# Patient Record
Sex: FEMALE | Race: BLACK OR AFRICAN AMERICAN
[De-identification: names, ages, dates, MRNs, and addresses within clinical notes are randomized per-mention and may not be internally consistent; named-entity substitution may affect disease eponyms.]

---

## 2017-08-24 ENCOUNTER — HOSPITAL ENCOUNTER (EMERGENCY)
Dept: HOSPITAL 23 - CED | Age: 27
Discharge: HOME | End: 2017-08-24
Payer: COMMERCIAL

## 2017-08-24 VITALS — WEIGHT: 124.98 LBS | HEIGHT: 66 IN | BODY MASS INDEX: 20.09 KG/M2

## 2017-08-24 DIAGNOSIS — N93.8: Primary | ICD-10-CM

## 2017-08-24 LAB
BARBITURATES UR QL SCN: 0.6 MG/DL (ref 0.2–2)
BARBITURATES UR QL SCN: 4 G/DL (ref 3.5–5)
BASOPHIL#: 0 X10E3 (ref 0–0.3)
BASOPHIL%: 0.5 % (ref 0–2.5)
BENZODIAZ UR QL SCN: 16 U/L (ref 10–40)
BENZODIAZ UR QL SCN: 20 U/L (ref 10–42)
BLOOD UREA NITROGEN: 11 MG/DL (ref 9–23)
BUN/CREATININE RATIO: 13.75
BZE UR QL SCN: 70 U/L (ref 32–92)
CALCIUM SERUM: 9 MG/DL (ref 8.4–10.2)
CK MB SERPL-RTO: 12.9 % (ref 11–15.5)
CK MB SERPL-RTO: 34.2 G/DL (ref 30–36)
CREATININE SERUM: 0.8 MG/DL (ref 0.6–1.4)
DIFF IND: NO
EOSINOPHIL#: 0.5 X10E3 (ref 0–0.7)
EOSINOPHIL%: 7.9 % (ref 0–7)
GENTAMICIN PEAK SERPL-MCNC: NO MG/L
GLOM FILT RATE ESTIMATED: 102.6 ML/MIN (ref 60–?)
GLUCOSE FASTING: 89 MG/DL (ref 70–110)
HEMATOCRIT: 37.5 % (ref 35–45)
HEMOGLOBIN: 12.8 GM/DL (ref 12–16)
KETONES UR QL: 104 MMOL/L (ref 100–111)
KETONES UR QL: 27 MMOL/L (ref 22–31)
LIPASE: 24 U/L (ref 22–51)
LYMPHOCYTE#: 3 X10E3 (ref 1–3.5)
LYMPHOCYTE%: 43.6 % (ref 17–45)
MEAN CELL VOLUME: 89.9 FL (ref 83–96)
MEAN CORPUSCULAR HEMOGLOBIN: 30.7 PG (ref 28–34)
MEAN PLATELET VOLUME: 7.9 FL (ref 6.5–11.5)
MONOCYTE#: 0.6 X10E3 (ref 0–1)
MONOCYTE%: 8.1 % (ref 3–12)
NEUTROPHIL#: 2.8 X10E3 (ref 1.5–7.1)
NEUTROPHIL%: 39.9 % (ref 40–75)
PLATELET COUNT: 270 X10E3 (ref 140–420)
POTASSIUM: 4.6 MMOL/L (ref 3.5–5.1)
PROTEIN TOTAL SERUM: 7.4 G/DL (ref 6–8.3)
RED BLOOD COUNT: 4.17 X10E (ref 3.9–5.3)
SODIUM: 137 MMOL/L (ref 135–145)
URBCS1 AUWI: (no result) /[HPF] (ref 0–2)
URINE APPEARANCE: CLEAR
URINE BACTERIA AUWI: (no result)
URINE BILIRUBIN: (no result)
URINE BLOOD: (no result)
URINE COLOR: YELLOW
URINE GLUCOSE: (no result) MG/DL
URINE KETONE: (no result)
URINE LEUKOCYTE ESTERASE: (no result)
URINE NITRATE: (no result)
URINE PH: 6.5 (ref 5–8)
URINE PROTEIN: (no result)
URINE SOURCE: (no result)
URINE SPECIFIC GRAVITY: 1.02 (ref 1–1.03)
URINE SQUAMOUS EPITHELIAL CELL: (no result) /[HPF]
URINE UROBILINOGEN: 0.2 MG/DL
UWBCS1 AUWI: (no result) (ref 0–5)
WHITE BLOOD COUNT: 6.9 X10E3 (ref 4–10.5)

## 2018-11-05 ENCOUNTER — HOSPITAL ENCOUNTER (EMERGENCY)
Facility: HOSPITAL | Age: 28
Discharge: HOME | End: 2018-11-05
Attending: EMERGENCY MEDICINE | Admitting: EMERGENCY MEDICINE

## 2018-11-05 ENCOUNTER — APPOINTMENT (OUTPATIENT)
Dept: CT IMAGING | Facility: HOSPITAL | Age: 28
End: 2018-11-05

## 2018-11-05 VITALS
RESPIRATION RATE: 18 BRPM | TEMPERATURE: 97.9 F | HEART RATE: 64 BPM | SYSTOLIC BLOOD PRESSURE: 133 MMHG | OXYGEN SATURATION: 98 % | WEIGHT: 149.9 LBS | DIASTOLIC BLOOD PRESSURE: 91 MMHG | HEIGHT: 69 IN | BODY MASS INDEX: 22.2 KG/M2

## 2018-11-05 DIAGNOSIS — R10.30 LOWER ABDOMINAL PAIN: Primary | ICD-10-CM

## 2018-11-05 DIAGNOSIS — N39.0 ACUTE UTI: ICD-10-CM

## 2018-11-05 LAB
ALBUMIN SERPL-MCNC: 3.8 G/DL (ref 3.5–5.2)
ALBUMIN/GLOB SERPL: 1.1 G/DL
ALP SERPL-CCNC: 74 U/L (ref 39–117)
ALT SERPL W P-5'-P-CCNC: 13 U/L (ref 1–33)
ANION GAP SERPL CALCULATED.3IONS-SCNC: 10.8 MMOL/L
AST SERPL-CCNC: 17 U/L (ref 1–32)
BACTERIA UR QL AUTO: ABNORMAL /HPF
BASOPHILS # BLD AUTO: 0.04 10*3/MM3 (ref 0–0.2)
BASOPHILS NFR BLD AUTO: 0.5 % (ref 0–1.5)
BILIRUB SERPL-MCNC: 0.3 MG/DL (ref 0.1–1.2)
BILIRUB UR QL STRIP: NEGATIVE
BUN BLD-MCNC: 12 MG/DL (ref 6–20)
BUN/CREAT SERPL: 16 (ref 7–25)
CALCIUM SPEC-SCNC: 8.8 MG/DL (ref 8.6–10.5)
CHLORIDE SERPL-SCNC: 105 MMOL/L (ref 98–107)
CLARITY UR: ABNORMAL
CO2 SERPL-SCNC: 25.2 MMOL/L (ref 22–29)
COLOR UR: YELLOW
CREAT BLD-MCNC: 0.75 MG/DL (ref 0.57–1)
DEPRECATED RDW RBC AUTO: 43.4 FL (ref 37–54)
EOSINOPHIL # BLD AUTO: 0.7 10*3/MM3 (ref 0–0.7)
EOSINOPHIL NFR BLD AUTO: 8.2 % (ref 0.3–6.2)
ERYTHROCYTE [DISTWIDTH] IN BLOOD BY AUTOMATED COUNT: 13.7 % (ref 11.7–13)
GFR SERPL CREATININE-BSD FRML MDRD: 113 ML/MIN/1.73
GLOBULIN UR ELPH-MCNC: 3.5 GM/DL
GLUCOSE BLD-MCNC: 120 MG/DL (ref 65–99)
GLUCOSE UR STRIP-MCNC: NEGATIVE MG/DL
HCG SERPL QL: NEGATIVE
HCT VFR BLD AUTO: 34.2 % (ref 35.6–45.5)
HGB BLD-MCNC: 11.4 G/DL (ref 11.9–15.5)
HGB UR QL STRIP.AUTO: ABNORMAL
HYALINE CASTS UR QL AUTO: ABNORMAL /LPF
IMM GRANULOCYTES # BLD: 0.01 10*3/MM3 (ref 0–0.03)
IMM GRANULOCYTES NFR BLD: 0.1 % (ref 0–0.5)
KETONES UR QL STRIP: NEGATIVE
LEUKOCYTE ESTERASE UR QL STRIP.AUTO: ABNORMAL
LYMPHOCYTES # BLD AUTO: 3.27 10*3/MM3 (ref 0.9–4.8)
LYMPHOCYTES NFR BLD AUTO: 38.4 % (ref 19.6–45.3)
MCH RBC QN AUTO: 29.1 PG (ref 26.9–32)
MCHC RBC AUTO-ENTMCNC: 33.3 G/DL (ref 32.4–36.3)
MCV RBC AUTO: 87.2 FL (ref 80.5–98.2)
MONOCYTES # BLD AUTO: 0.74 10*3/MM3 (ref 0.2–1.2)
MONOCYTES NFR BLD AUTO: 8.7 % (ref 5–12)
NEUTROPHILS # BLD AUTO: 3.77 10*3/MM3 (ref 1.9–8.1)
NEUTROPHILS NFR BLD AUTO: 44.2 % (ref 42.7–76)
NITRITE UR QL STRIP: NEGATIVE
PH UR STRIP.AUTO: 6 [PH] (ref 5–8)
PLATELET # BLD AUTO: 262 10*3/MM3 (ref 140–500)
PMV BLD AUTO: 9.9 FL (ref 6–12)
POTASSIUM BLD-SCNC: 3.5 MMOL/L (ref 3.5–5.2)
PROT SERPL-MCNC: 7.3 G/DL (ref 6–8.5)
PROT UR QL STRIP: NEGATIVE
RBC # BLD AUTO: 3.92 10*6/MM3 (ref 3.9–5.2)
RBC # UR: ABNORMAL /HPF
REF LAB TEST METHOD: ABNORMAL
SODIUM BLD-SCNC: 141 MMOL/L (ref 136–145)
SP GR UR STRIP: >=1.03 (ref 1–1.03)
SQUAMOUS #/AREA URNS HPF: ABNORMAL /HPF
UROBILINOGEN UR QL STRIP: ABNORMAL
WBC NRBC COR # BLD: 8.52 10*3/MM3 (ref 4.5–10.7)
WBC UR QL AUTO: ABNORMAL /HPF

## 2018-11-05 PROCEDURE — 81001 URINALYSIS AUTO W/SCOPE: CPT | Performed by: NURSE PRACTITIONER

## 2018-11-05 PROCEDURE — 80053 COMPREHEN METABOLIC PANEL: CPT | Performed by: NURSE PRACTITIONER

## 2018-11-05 PROCEDURE — 99284 EMERGENCY DEPT VISIT MOD MDM: CPT

## 2018-11-05 PROCEDURE — 84703 CHORIONIC GONADOTROPIN ASSAY: CPT | Performed by: NURSE PRACTITIONER

## 2018-11-05 PROCEDURE — 87086 URINE CULTURE/COLONY COUNT: CPT | Performed by: EMERGENCY MEDICINE

## 2018-11-05 PROCEDURE — 25010000002 IOPAMIDOL 61 % SOLUTION: Performed by: EMERGENCY MEDICINE

## 2018-11-05 PROCEDURE — 85025 COMPLETE CBC W/AUTO DIFF WBC: CPT | Performed by: NURSE PRACTITIONER

## 2018-11-05 PROCEDURE — 74177 CT ABD & PELVIS W/CONTRAST: CPT

## 2018-11-05 RX ORDER — SULFAMETHOXAZOLE AND TRIMETHOPRIM 800; 160 MG/1; MG/1
1 TABLET ORAL 2 TIMES DAILY
Qty: 10 TABLET | Refills: 0 | Status: SHIPPED | OUTPATIENT
Start: 2018-11-05 | End: 2022-07-14

## 2018-11-05 RX ADMIN — IOPAMIDOL 85 ML: 612 INJECTION, SOLUTION INTRAVENOUS at 08:24

## 2018-11-05 NOTE — ED NOTES
Patient ambulated to restroom, but was unable to give a urine specimen at this tiime.     Edwina Gonzalez RN  11/05/18 0709

## 2018-11-05 NOTE — ED PROVIDER NOTES
"EMERGENCY DEPARTMENT ENCOUNTER    CHIEF COMPLAINT  Chief Complaint: Lower abd pain  History given by:Pt  History limited by:Nothing  Time Seen: 0642  Room Number: 15/15  PMD: Provider, No Known      HPI:  Pt is a 28 y.o. female who presents with lower abd pain which began an hour ago and woke her from sleep. The pt denies N/V/D, back pain, dysuria, or difficulty urinating. The pt drank milk after the pain started.     Duration: 1 hour  Timing:Constant  Location:Lower abd  Radiation:None  Quality:\"pain\"  Intensity/Severity:Moderate  Progression:No change  Associated Symptoms:None  Aggravating Factors:Unknown  Alleviating Factors:Unknown  Treatment before arrival:None    PAST MEDICAL HISTORY  Active Ambulatory Problems     Diagnosis Date Noted   • No Active Ambulatory Problems     Resolved Ambulatory Problems     Diagnosis Date Noted   • No Resolved Ambulatory Problems     No Additional Past Medical History       PAST SURGICAL HISTORY  History reviewed. No pertinent surgical history.    FAMILY HISTORY  History reviewed. No pertinent family history.    SOCIAL HISTORY  Social History     Social History   • Marital status:      Spouse name: N/A   • Number of children: N/A   • Years of education: N/A     Occupational History   • Not on file.     Social History Main Topics   • Smoking status: Never Smoker   • Smokeless tobacco: Not on file   • Alcohol use No   • Drug use: No   • Sexual activity: Not on file     Other Topics Concern   • Not on file     Social History Narrative   • No narrative on file         ALLERGIES  Patient has no known allergies.    REVIEW OF SYSTEMS  Review of Systems   Constitutional: Negative.  Negative for activity change, appetite change ( decreased), chills, fatigue and fever.   HENT: Negative.  Negative for congestion, ear pain, rhinorrhea and sore throat.    Eyes: Negative.    Respiratory: Negative.  Negative for cough and shortness of breath.    Cardiovascular: Negative.  Negative for " chest pain, palpitations and leg swelling ( pedal).   Gastrointestinal: Positive for abdominal pain. Negative for constipation, diarrhea, nausea and vomiting.   Endocrine: Negative.    Genitourinary: Negative.  Negative for decreased urine volume, difficulty urinating, dysuria, menstrual problem, pelvic pain, urgency and vaginal discharge.   Musculoskeletal: Negative.  Negative for back pain.   Skin: Negative.  Negative for rash.   Allergic/Immunologic: Negative.    Neurological: Negative.  Negative for dizziness, weakness, light-headedness, numbness and headaches.   Hematological: Negative.    Psychiatric/Behavioral: Negative.  The patient is not nervous/anxious.    All other systems reviewed and are negative.      PHYSICAL EXAM  ED Triage Vitals   Temp Heart Rate Resp BP SpO2   11/05/18 0633 11/05/18 0632 11/05/18 0632 11/05/18 0632 11/05/18 0632   98.3 °F (36.8 °C) 64 16 145/98 99 %      Temp src Heart Rate Source Patient Position BP Location FiO2 (%)   11/05/18 0633 -- -- -- --   Tympanic           Physical Exam   Constitutional: She is well-developed, well-nourished, and in no distress. No distress.   HENT:   Head: Normocephalic and atraumatic.   Mouth/Throat: Oropharynx is clear and moist and mucous membranes are normal.   Eyes: Pupils are equal, round, and reactive to light.   Neck: Normal range of motion.   Cardiovascular: Normal rate, regular rhythm and normal heart sounds.    Pulmonary/Chest: Effort normal and breath sounds normal. She has no wheezes.   Abdominal: Soft. Bowel sounds are normal. There is tenderness ( mild ) in the suprapubic area.   Musculoskeletal: Normal range of motion. She exhibits no edema.   Neurological: She is alert.   Skin: Skin is warm and dry. No rash noted.   Psychiatric: Mood, memory, affect and judgment normal.   Nursing note and vitals reviewed.      LAB RESULTS  Recent Results (from the past 24 hour(s))   hCG, Serum, Qualitative    Collection Time: 11/05/18  7:03 AM   Result  Value Ref Range    HCG Qualitative Negative Negative   Comprehensive Metabolic Panel    Collection Time: 11/05/18  7:04 AM   Result Value Ref Range    Glucose 120 (H) 65 - 99 mg/dL    BUN 12 6 - 20 mg/dL    Creatinine 0.75 0.57 - 1.00 mg/dL    Sodium 141 136 - 145 mmol/L    Potassium 3.5 3.5 - 5.2 mmol/L    Chloride 105 98 - 107 mmol/L    CO2 25.2 22.0 - 29.0 mmol/L    Calcium 8.8 8.6 - 10.5 mg/dL    Total Protein 7.3 6.0 - 8.5 g/dL    Albumin 3.80 3.50 - 5.20 g/dL    ALT (SGPT) 13 1 - 33 U/L    AST (SGOT) 17 1 - 32 U/L    Alkaline Phosphatase 74 39 - 117 U/L    Total Bilirubin 0.3 0.1 - 1.2 mg/dL    eGFR  African Amer 113 >60 mL/min/1.73    Globulin 3.5 gm/dL    A/G Ratio 1.1 g/dL    BUN/Creatinine Ratio 16.0 7.0 - 25.0    Anion Gap 10.8 mmol/L   CBC Auto Differential    Collection Time: 11/05/18  7:04 AM   Result Value Ref Range    WBC 8.52 4.50 - 10.70 10*3/mm3    RBC 3.92 3.90 - 5.20 10*6/mm3    Hemoglobin 11.4 (L) 11.9 - 15.5 g/dL    Hematocrit 34.2 (L) 35.6 - 45.5 %    MCV 87.2 80.5 - 98.2 fL    MCH 29.1 26.9 - 32.0 pg    MCHC 33.3 32.4 - 36.3 g/dL    RDW 13.7 (H) 11.7 - 13.0 %    RDW-SD 43.4 37.0 - 54.0 fl    MPV 9.9 6.0 - 12.0 fL    Platelets 262 140 - 500 10*3/mm3    Neutrophil % 44.2 42.7 - 76.0 %    Lymphocyte % 38.4 19.6 - 45.3 %    Monocyte % 8.7 5.0 - 12.0 %    Eosinophil % 8.2 (H) 0.3 - 6.2 %    Basophil % 0.5 0.0 - 1.5 %    Immature Grans % 0.1 0.0 - 0.5 %    Neutrophils, Absolute 3.77 1.90 - 8.10 10*3/mm3    Lymphocytes, Absolute 3.27 0.90 - 4.80 10*3/mm3    Monocytes, Absolute 0.74 0.20 - 1.20 10*3/mm3    Eosinophils, Absolute 0.70 0.00 - 0.70 10*3/mm3    Basophils, Absolute 0.04 0.00 - 0.20 10*3/mm3    Immature Grans, Absolute 0.01 0.00 - 0.03 10*3/mm3   Urinalysis With Microscopic If Indicated (No Culture) - Urine, Clean Catch    Collection Time: 11/05/18  8:50 AM   Result Value Ref Range    Color, UA Yellow Yellow, Straw    Appearance, UA Cloudy (A) Clear    pH, UA 6.0 5.0 - 8.0    Specific  "Gravity, UA >=1.030 1.005 - 1.030    Glucose, UA Negative Negative    Ketones, UA Negative Negative    Bilirubin, UA Negative Negative    Blood, UA Trace (A) Negative    Protein, UA Negative Negative    Leuk Esterase, UA Large (3+) (A) Negative    Nitrite, UA Negative Negative    Urobilinogen, UA 0.2 E.U./dL 0.2 - 1.0 E.U./dL   Urinalysis, Microscopic Only - Urine, Clean Catch    Collection Time: 11/05/18  8:50 AM   Result Value Ref Range    RBC, UA 0-2 None Seen, 0-2 /HPF    WBC, UA Too Numerous to Count (A) None Seen, 0-2 /HPF    Bacteria, UA 2+ (A) None Seen /HPF    Squamous Epithelial Cells, UA 3-6 (A) None Seen, 0-2 /HPF    Hyaline Casts, UA 0-2 None Seen /LPF    Methodology Automated Microscopy        I ordered the above labs and reviewed the results    RADIOLOGY  CT Abdomen Pelvis With Contrast   Preliminary Result   No acute abnormality within the abdomen and pelvis.       These findings were discussed with Dr. Cody by telephone.       Radiation dose reduction techniques were utilized, including automated   exposure control and exposure modulation based on body size.                  I ordered the above noted radiological studies and reviewed the images on the PACS system.        PROGRESS AND CONSULTS    0748 Reviewed pt's history and workup with Dr. Cody. Care turned over to Dr. Cody, who will disposition of pt after reviewing lab and imaging results.       MEDICATIONS GIVEN IN ER  Medications   iopamidol (ISOVUE-300) 61 % injection 100 mL (85 mL Intravenous Given by Other 11/5/18 0824)       /91 (BP Location: Right arm, Patient Position: Sitting)   Pulse 64   Temp 97.9 °F (36.6 °C) (Oral)   Resp 18   Ht 175.3 cm (69\")   Wt 68 kg (149 lb 14.4 oz)   SpO2 98%   BMI 22.14 kg/m²       Documentation assistance provided by gonzalo Mcfadden for BETTINA Francis.  Information recorded by the scribe was done at my direction and has been verified and validated by me.       Lesa, " Juanita  11/05/18 0737       Juanita Mcfadden  11/05/18 0805       Hodan Man, APRN  11/05/18 1013

## 2018-11-05 NOTE — ED PROVIDER NOTES
Pt presents to the ED c/o lower abd pain which began this morning.. Pt denies fever, chills, or urinary sx.  Pt reports her last BM was this morning and was normal for her.  Pt reports her last menstrual cycle was 10/71/8.  Pt denies abd surgical hx.    On exam, pt has RLQ abd TTP and mild referred pain with palpation of LLQ, no guarding or rebound.    Plan: Will check CT Abd/Pelvis to further evaluate for appendicitis or ovarian cyst.    0919: Spoke with radiologist about CT results, no acute findings.  Plan for discharge with abx for UTI and outpatient f/u.     DIAGNOSIS  1. Lower abdominal pain    2. Acute UTI        DISPOSITION  DISCHARGE    Patient discharged in stable condition.    Reviewed implications of results, diagnosis, meds, responsibility to follow up, warning signs and symptoms of possible worsening, potential complications and reasons to return to ER.    Patient/Family voiced understanding of above instructions.    Discussed plan for discharge, as there is no emergent indication for admission. Patient referred to primary care provider for BP management due to today's BP. Pt/family is agreeable and understands need for follow up and repeat testing.  Pt is aware that discharge does not mean that nothing is wrong but it indicates no emergency is present that requires admission and they must continue care with follow-up as given below or physician of their choice.     FOLLOW-UP  PATIENT LIAISON [facility] [address] [phone]  Schedule an appointment as soon as possible for a visit   As needed    [facility] Emergency Department  [address] [facility] [address] [phone]    As needed, If symptoms worsen         Medication List      New Prescriptions    sulfamethoxazole-trimethoprim 800-160 MG per tablet  Commonly known as:  BACTRIM DS,SEPTRA DS  Take 1 tablet by mouth 2 (Two) Times a Day.              MD ATTESTATION NOTE    The ALEXANDRA and I have  discussed this patient's history, physical exam, and treatment plan.  I have reviewed the documentation and personally had a face to face interaction with the patient. I affirm the documentation and agree with the treatment and plan.  The attached note describes my personal findings.    Documentation assistance provided by gonzalo Raya for Dr. Cody. Information recorded by the scribe was done at my direction and has been verified and validated by me.         Anastasiya Raya  11/05/18 0920       Devante Cody MD  11/05/18 3755

## 2018-11-06 LAB — BACTERIA SPEC AEROBE CULT: NO GROWTH

## 2021-04-14 ENCOUNTER — APPOINTMENT (OUTPATIENT)
Dept: CT IMAGING | Facility: HOSPITAL | Age: 31
End: 2021-04-14

## 2021-04-14 ENCOUNTER — HOSPITAL ENCOUNTER (EMERGENCY)
Facility: HOSPITAL | Age: 31
Discharge: HOME | End: 2021-04-14
Attending: EMERGENCY MEDICINE | Admitting: EMERGENCY MEDICINE

## 2021-04-14 VITALS
HEIGHT: 66 IN | OXYGEN SATURATION: 98 % | TEMPERATURE: 97.5 F | DIASTOLIC BLOOD PRESSURE: 72 MMHG | HEART RATE: 64 BPM | BODY MASS INDEX: 21.21 KG/M2 | SYSTOLIC BLOOD PRESSURE: 108 MMHG | RESPIRATION RATE: 16 BRPM | WEIGHT: 132 LBS

## 2021-04-14 DIAGNOSIS — R10.13 EPIGASTRIC PAIN: ICD-10-CM

## 2021-04-14 DIAGNOSIS — K29.00 ACUTE GASTRITIS, PRESENCE OF BLEEDING UNSPECIFIED, UNSPECIFIED GASTRITIS TYPE: Primary | ICD-10-CM

## 2021-04-14 LAB
ALBUMIN SERPL-MCNC: 3.9 G/DL (ref 3.5–5.2)
ALBUMIN/GLOB SERPL: 1.3 G/DL
ALP SERPL-CCNC: 85 U/L (ref 39–117)
ALT SERPL W P-5'-P-CCNC: 14 U/L (ref 1–33)
ANION GAP SERPL CALCULATED.3IONS-SCNC: 7.1 MMOL/L (ref 5–15)
AST SERPL-CCNC: 16 U/L (ref 1–32)
B-HCG UR QL: NEGATIVE
BASOPHILS # BLD AUTO: 0.04 10*3/MM3 (ref 0–0.2)
BASOPHILS NFR BLD AUTO: 0.6 % (ref 0–1.5)
BILIRUB SERPL-MCNC: 0.2 MG/DL (ref 0–1.2)
BILIRUB UR QL STRIP: NEGATIVE
BUN SERPL-MCNC: 13 MG/DL (ref 6–20)
BUN/CREAT SERPL: 21 (ref 7–25)
CALCIUM SPEC-SCNC: 9 MG/DL (ref 8.6–10.5)
CHLORIDE SERPL-SCNC: 106 MMOL/L (ref 98–107)
CLARITY UR: CLEAR
CO2 SERPL-SCNC: 23.9 MMOL/L (ref 22–29)
COLOR UR: ABNORMAL
CREAT SERPL-MCNC: 0.62 MG/DL (ref 0.57–1)
DEPRECATED RDW RBC AUTO: 41.3 FL (ref 37–54)
EOSINOPHIL # BLD AUTO: 0.52 10*3/MM3 (ref 0–0.4)
EOSINOPHIL NFR BLD AUTO: 8.3 % (ref 0.3–6.2)
ERYTHROCYTE [DISTWIDTH] IN BLOOD BY AUTOMATED COUNT: 12.6 % (ref 12.3–15.4)
GFR SERPL CREATININE-BSD FRML MDRD: 138 ML/MIN/1.73
GLOBULIN UR ELPH-MCNC: 2.9 GM/DL
GLUCOSE SERPL-MCNC: 90 MG/DL (ref 65–99)
GLUCOSE UR STRIP-MCNC: NEGATIVE MG/DL
HCT VFR BLD AUTO: 34.8 % (ref 34–46.6)
HGB BLD-MCNC: 12.2 G/DL (ref 12–15.9)
HGB UR QL STRIP.AUTO: NEGATIVE
IMM GRANULOCYTES # BLD AUTO: 0.01 10*3/MM3 (ref 0–0.05)
IMM GRANULOCYTES NFR BLD AUTO: 0.2 % (ref 0–0.5)
KETONES UR QL STRIP: ABNORMAL
LEUKOCYTE ESTERASE UR QL STRIP.AUTO: NEGATIVE
LIPASE SERPL-CCNC: 34 U/L (ref 13–60)
LYMPHOCYTES # BLD AUTO: 3.11 10*3/MM3 (ref 0.7–3.1)
LYMPHOCYTES NFR BLD AUTO: 49.4 % (ref 19.6–45.3)
MCH RBC QN AUTO: 31.7 PG (ref 26.6–33)
MCHC RBC AUTO-ENTMCNC: 35.1 G/DL (ref 31.5–35.7)
MCV RBC AUTO: 90.4 FL (ref 79–97)
MONOCYTES # BLD AUTO: 0.71 10*3/MM3 (ref 0.1–0.9)
MONOCYTES NFR BLD AUTO: 11.3 % (ref 5–12)
NEUTROPHILS NFR BLD AUTO: 1.91 10*3/MM3 (ref 1.7–7)
NEUTROPHILS NFR BLD AUTO: 30.2 % (ref 42.7–76)
NITRITE UR QL STRIP: NEGATIVE
NRBC BLD AUTO-RTO: 0 /100 WBC (ref 0–0.2)
PH UR STRIP.AUTO: 5.5 [PH] (ref 5–8)
PLATELET # BLD AUTO: 251 10*3/MM3 (ref 140–450)
PMV BLD AUTO: 10.4 FL (ref 6–12)
POTASSIUM SERPL-SCNC: 4 MMOL/L (ref 3.5–5.2)
PROT SERPL-MCNC: 6.8 G/DL (ref 6–8.5)
PROT UR QL STRIP: ABNORMAL
RBC # BLD AUTO: 3.85 10*6/MM3 (ref 3.77–5.28)
SODIUM SERPL-SCNC: 137 MMOL/L (ref 136–145)
SP GR UR STRIP: >=1.03 (ref 1–1.03)
UROBILINOGEN UR QL STRIP: ABNORMAL
WBC # BLD AUTO: 6.3 10*3/MM3 (ref 3.4–10.8)

## 2021-04-14 PROCEDURE — 74177 CT ABD & PELVIS W/CONTRAST: CPT

## 2021-04-14 PROCEDURE — 99283 EMERGENCY DEPT VISIT LOW MDM: CPT

## 2021-04-14 PROCEDURE — 80053 COMPREHEN METABOLIC PANEL: CPT | Performed by: PHYSICIAN ASSISTANT

## 2021-04-14 PROCEDURE — 81003 URINALYSIS AUTO W/O SCOPE: CPT | Performed by: PHYSICIAN ASSISTANT

## 2021-04-14 PROCEDURE — 85025 COMPLETE CBC W/AUTO DIFF WBC: CPT | Performed by: PHYSICIAN ASSISTANT

## 2021-04-14 PROCEDURE — 81025 URINE PREGNANCY TEST: CPT | Performed by: PHYSICIAN ASSISTANT

## 2021-04-14 PROCEDURE — 25010000002 IOPAMIDOL 61 % SOLUTION: Performed by: EMERGENCY MEDICINE

## 2021-04-14 PROCEDURE — 83690 ASSAY OF LIPASE: CPT | Performed by: PHYSICIAN ASSISTANT

## 2021-04-14 RX ORDER — ALUMINA, MAGNESIA, AND SIMETHICONE 2400; 2400; 240 MG/30ML; MG/30ML; MG/30ML
15 SUSPENSION ORAL ONCE
Status: COMPLETED | OUTPATIENT
Start: 2021-04-14 | End: 2021-04-14

## 2021-04-14 RX ORDER — LIDOCAINE HYDROCHLORIDE 20 MG/ML
15 SOLUTION OROPHARYNGEAL ONCE
Status: COMPLETED | OUTPATIENT
Start: 2021-04-14 | End: 2021-04-14

## 2021-04-14 RX ORDER — SUCRALFATE 1 G/1
1 TABLET ORAL 4 TIMES DAILY
Qty: 28 TABLET | Refills: 0 | Status: SHIPPED | OUTPATIENT
Start: 2021-04-14 | End: 2021-05-24

## 2021-04-14 RX ORDER — PANTOPRAZOLE SODIUM 40 MG/1
40 TABLET, DELAYED RELEASE ORAL DAILY
Qty: 14 TABLET | Refills: 0 | Status: SHIPPED | OUTPATIENT
Start: 2021-04-14 | End: 2021-04-20 | Stop reason: SDUPTHER

## 2021-04-14 RX ORDER — SODIUM CHLORIDE 0.9 % (FLUSH) 0.9 %
10 SYRINGE (ML) INJECTION AS NEEDED
Status: DISCONTINUED | OUTPATIENT
Start: 2021-04-14 | End: 2021-04-14 | Stop reason: HOSPADM

## 2021-04-14 RX ORDER — SUCRALFATE 1 G/1
1 TABLET ORAL 4 TIMES DAILY
Qty: 28 TABLET | Refills: 0 | Status: SHIPPED | OUTPATIENT
Start: 2021-04-14 | End: 2021-04-14 | Stop reason: SDUPTHER

## 2021-04-14 RX ORDER — PANTOPRAZOLE SODIUM 40 MG/1
40 TABLET, DELAYED RELEASE ORAL DAILY
Qty: 14 TABLET | Refills: 0 | Status: SHIPPED | OUTPATIENT
Start: 2021-04-14 | End: 2021-04-14 | Stop reason: SDUPTHER

## 2021-04-14 RX ADMIN — LIDOCAINE HYDROCHLORIDE 15 ML: 20 SOLUTION ORAL; TOPICAL at 04:14

## 2021-04-14 RX ADMIN — MAGNESIUM HYDROXIDE,ALUMINUM HYDROXICE,SIMETHICONE 15 ML: 240; 2400; 2400 SUSPENSION ORAL at 04:14

## 2021-04-14 RX ADMIN — IOPAMIDOL 85 ML: 612 INJECTION, SOLUTION INTRAVENOUS at 04:01

## 2021-04-14 NOTE — ED PROVIDER NOTES
EMERGENCY DEPARTMENT ENCOUNTER    Room Number:  08/08  Date of encounter:  4/14/2021  PCP: Provider, No Known  Historian: Patient      HPI:  Chief Complaint: Abdominal pain  A complete HPI/ROS/PMH/PSH/SH/FH are unobtainable due to: Nothing    Context: Alyx Davis is a 31 y.o. female who presents to the ED c/o abdominal pain is been intermittent and ongoing for the past year.  Patient states is located in the epigastric region she describes it as a 9 out of 10 at present and states it radiates to the back.  She was seen at immediate care on 4/9/2021 diagnosed with gastroesophageal reflux disease and started on famotidine.  She states since starting the medication she has no relief and the pain was bad enough tonight that she could not sleep.  She states she has had nonbloody/coffee-ground emesis episodes do contain undigested food and has had daily bowel movements.  She states her bowel movements have been normal in consistency and denies blood or bile.  Eating exacerbates her pain by fasting her pain slightly improves.  She is here for further evaluation and treatment.      PAST MEDICAL HISTORY  Active Ambulatory Problems     Diagnosis Date Noted   • No Active Ambulatory Problems     Resolved Ambulatory Problems     Diagnosis Date Noted   • No Resolved Ambulatory Problems     No Additional Past Medical History         PAST SURGICAL HISTORY  No past surgical history on file.      FAMILY HISTORY  No family history on file.      SOCIAL HISTORY  Social History     Socioeconomic History   • Marital status:      Spouse name: Not on file   • Number of children: Not on file   • Years of education: Not on file   • Highest education level: Not on file   Tobacco Use   • Smoking status: Never Smoker   Substance and Sexual Activity   • Alcohol use: No   • Drug use: No         ALLERGIES  Patient has no known allergies.        REVIEW OF SYSTEMS  Review of Systems   Constitutional: Negative for chills and fever.   HENT:  Negative.    Eyes: Negative.    Respiratory: Negative for cough and shortness of breath.    Cardiovascular: Negative for chest pain and palpitations.   Gastrointestinal: Positive for abdominal pain.   Genitourinary: Negative.    Musculoskeletal: Positive for back pain.   Skin: Negative.    Neurological: Negative.    Psychiatric/Behavioral: Negative.         All systems reviewed and negative except for those discussed in HPI.       PHYSICAL EXAM    I have reviewed the triage vital signs and nursing notes.    ED Triage Vitals   Temp Heart Rate Resp BP SpO2   04/14/21 0057 04/14/21 0055 04/14/21 0055 04/14/21 0057 04/14/21 0055   97.6 °F (36.4 °C) 66 16 117/86 99 %      Temp src Heart Rate Source Patient Position BP Location FiO2 (%)   04/14/21 0057 -- -- -- --   Tympanic           Physical Exam  GENERAL: DW female no acute distress  HENT: nares patent, face symmetric, speech  EYES: no scleral icterus  CV: regular rhythm, regular rate no obvious rubs murmurs gallops  RESPIRATORY: normal effort lungs CTA B  ABDOMEN: TTP in the epigastric region, no guarding, no rebound no obvious mass or hepatomegaly/splenomegaly  MUSCULOSKELETAL: no deformity  NEURO: alert and oriented x4, moves all extremities, follows commands  SKIN: warm, dry, no rash noted to the area of concern        LAB RESULTS  Recent Results (from the past 24 hour(s))   Urinalysis With Microscopic If Indicated (No Culture) - Urine, Clean Catch    Collection Time: 04/14/21  2:54 AM    Specimen: Urine, Clean Catch   Result Value Ref Range    Color, UA Dark Yellow (A) Yellow, Straw    Appearance, UA Clear Clear    pH, UA 5.5 5.0 - 8.0    Specific Gravity, UA >=1.030 1.005 - 1.030    Glucose, UA Negative Negative    Ketones, UA Trace (A) Negative    Bilirubin, UA Negative Negative    Blood, UA Negative Negative    Protein, UA Trace (A) Negative    Leuk Esterase, UA Negative Negative    Nitrite, UA Negative Negative    Urobilinogen, UA 1.0 E.U./dL 0.2 - 1.0 E.U./dL    Pregnancy, Urine - Urine, Clean Catch    Collection Time: 04/14/21  2:54 AM    Specimen: Urine, Clean Catch   Result Value Ref Range    HCG, Urine QL Negative Negative   Comprehensive Metabolic Panel    Collection Time: 04/14/21  3:05 AM    Specimen: Blood   Result Value Ref Range    Glucose 90 65 - 99 mg/dL    BUN 13 6 - 20 mg/dL    Creatinine 0.62 0.57 - 1.00 mg/dL    Sodium 137 136 - 145 mmol/L    Potassium 4.0 3.5 - 5.2 mmol/L    Chloride 106 98 - 107 mmol/L    CO2 23.9 22.0 - 29.0 mmol/L    Calcium 9.0 8.6 - 10.5 mg/dL    Total Protein 6.8 6.0 - 8.5 g/dL    Albumin 3.90 3.50 - 5.20 g/dL    ALT (SGPT) 14 1 - 33 U/L    AST (SGOT) 16 1 - 32 U/L    Alkaline Phosphatase 85 39 - 117 U/L    Total Bilirubin 0.2 0.0 - 1.2 mg/dL    eGFR  African Amer 138 >60 mL/min/1.73    Globulin 2.9 gm/dL    A/G Ratio 1.3 g/dL    BUN/Creatinine Ratio 21.0 7.0 - 25.0    Anion Gap 7.1 5.0 - 15.0 mmol/L   Lipase    Collection Time: 04/14/21  3:05 AM    Specimen: Blood   Result Value Ref Range    Lipase 34 13 - 60 U/L   CBC Auto Differential    Collection Time: 04/14/21  3:05 AM    Specimen: Blood   Result Value Ref Range    WBC 6.30 3.40 - 10.80 10*3/mm3    RBC 3.85 3.77 - 5.28 10*6/mm3    Hemoglobin 12.2 12.0 - 15.9 g/dL    Hematocrit 34.8 34.0 - 46.6 %    MCV 90.4 79.0 - 97.0 fL    MCH 31.7 26.6 - 33.0 pg    MCHC 35.1 31.5 - 35.7 g/dL    RDW 12.6 12.3 - 15.4 %    RDW-SD 41.3 37.0 - 54.0 fl    MPV 10.4 6.0 - 12.0 fL    Platelets 251 140 - 450 10*3/mm3    Neutrophil % 30.2 (L) 42.7 - 76.0 %    Lymphocyte % 49.4 (H) 19.6 - 45.3 %    Monocyte % 11.3 5.0 - 12.0 %    Eosinophil % 8.3 (H) 0.3 - 6.2 %    Basophil % 0.6 0.0 - 1.5 %    Immature Grans % 0.2 0.0 - 0.5 %    Neutrophils, Absolute 1.91 1.70 - 7.00 10*3/mm3    Lymphocytes, Absolute 3.11 (H) 0.70 - 3.10 10*3/mm3    Monocytes, Absolute 0.71 0.10 - 0.90 10*3/mm3    Eosinophils, Absolute 0.52 (H) 0.00 - 0.40 10*3/mm3    Basophils, Absolute 0.04 0.00 - 0.20 10*3/mm3    Immature Grans,  Absolute 0.01 0.00 - 0.05 10*3/mm3    nRBC 0.0 0.0 - 0.2 /100 WBC       Ordered the above labs and independently reviewed the results.        RADIOLOGY  CT Abdomen Pelvis With Contrast    Result Date: 4/14/2021  Patient: FELY OCONNELL  Time Out: 05:10 Exam(s): CT ABDOMEN + PELVIS With Contrast EXAM:   CT Abdomen and Pelvis With Intravenous Contrast CLINICAL HISTORY:    Abdominal pain  Reason for exam: Abdominal pain. TECHNIQUE:   Axial computed tomography images of the abdomen and pelvis with intravenous contrast.  CTDI is 9.30 mGy and DLP is 457.10 mGy-cm.  This CT exam was performed according to the principle of ALARA (As Low As Reasonably Achievable) by using one or more of the following dose reduction techniques: automated exposure control, adjustment of the mA and or kV according to patient size, and or use of iterative reconstruction technique. COMPARISON:   No relevant prior studies available. FINDINGS:   Lung bases:  Unremarkable.  No mass.  No consolidation.  ABDOMEN:   Liver:  Unremarkable.  No mass.   Gallbladder and bile ducts:  Unremarkable.  No calcified stones.  No ductal dilation.   Pancreas:  Unremarkable.  No mass.  No ductal dilation.   Spleen:  Unremarkable.  No splenomegaly.   Adrenals:  Unremarkable.  No mass.   Kidneys and ureters:  Unremarkable.  No solid mass.  No hydronephrosis.   Stomach and bowel: Moderate fecal burden throughout the colon.  No obstruction.  No mucosal thickening.  PELVIS:   Appendix:  No findings to suggest acute appendicitis.   Bladder:  Unremarkable.  No mass.   Reproductive:  1.6 cm right sided corpus luteum cyst. .  ABDOMEN and PELVIS:   Intraperitoneal space:  Unremarkable.  No free air.  No significant fluid collection.   Bones joints:  No acute fracture.  No dislocation.   Soft tissues:  Unremarkable.   Vasculature:  Unremarkable.  No abdominal aortic aneurysm.   Lymph nodes:  Unremarkable.  No enlarged lymph nodes. IMPRESSION: 1. 1.6 cm right sided corpus  luteum cyst. 2.  Otherwise no acute intra-abdominal process.    Electronically signed by Junior Nix M.D. on 04-14-21 at 0510      I ordered the above noted radiological studies. Reviewed by me and discussed with radiologist.  See dictation for official radiology interpretation.      PROCEDURES    Procedures      MEDICATIONS GIVEN IN ER    Medications   sodium chloride 0.9 % flush 10 mL (has no administration in time range)   iopamidol (ISOVUE-300) 61 % injection 100 mL (85 mL Intravenous Given by Other 4/14/21 0401)   aluminum-magnesium hydroxide-simethicone (MAALOX MAX) 400-400-40 MG/5ML suspension 15 mL (15 mL Oral Given 4/14/21 1744)   Lidocaine Viscous HCl (XYLOCAINE) 2 % mouth solution 15 mL (15 mL Mouth/Throat Given 4/14/21 0414)         PROGRESS, DATA ANALYSIS, CONSULTS, AND MEDICAL DECISION MAKING    All labs have been independently reviewed by me.  All radiology studies have been reviewed by me and discussed with radiologist dictating the report.   EKG's independently viewed and interpreted by me.  Discussion below represents my analysis of pertinent findings related to patient's condition, differential diagnosis, treatment plan and final disposition.    DDx includes but is not limited to: Gastritis, peptic ulcer disease, GERD, pancreatitis, cholecystitis, biliary colic, gastric motility disorder, hepatosplenomegaly.  CT abdomen pelvis as well as lab work unremarkable.  There is noted to be a corpus luteum cyst but she is not complaining of lower abdominal or pelvic pain.  Her symptoms are most consistent with a gastritis/GERD scenario.  Plan to treat with a PPI and Carafate and have the patient to follow-up with on-call GI for further evaluation.  We will have her return to the emergency department should her symptoms worsen and/or should she develop a fever.    ED Course as of Apr 14 0606   Wed Apr 14, 2021   0209 BP: 117/86 [RC]   0209 Temp: 97.6 °F (36.4 °C) [RC]   0209 Heart Rate: 66 [RC]   0209  Resp: 16 [RC]   0209 SpO2: 99 % [RC]   0506 WBC: 6.30 [RC]   0506 RBC: 3.85 [RC]   0506 Hemoglobin: 12.2 [RC]   0506 Hematocrit: 34.8 [RC]   0506 Platelets: 251 [RC]   0506 Lipase: 34 [RC]   0506 Glucose: 90 [RC]   0506 BUN: 13 [RC]   0506 Creatinine: 0.62 [RC]   0506 Sodium: 137 [RC]   0506 Potassium: 4.0 [RC]   0506 CO2: 23.9 [RC]   0506 Anion Gap: 7.1 [RC]   0506 ALT (SGPT): 14 [RC]   0506 AST (SGOT): 16 [RC]   0506 Alkaline Phosphatase: 85 [RC]   0506 Total Bilirubin: 0.2 [RC]      ED Course User Index  [RC] Cresencio Randle III, PA     Patient was placed in face mask in first look. Patient was wearing facemask when I entered the room and throughout our encounter. I wore full protective equipment throughout this patient encounter including a face mask, eye shield, gown and gloves. Hand hygiene was performed before donning protective equipment and after removal when leaving the room.    AS OF 06:06 EDT VITALS:    BP - 123/72  HR - 61  TEMP - 97.6 °F (36.4 °C) (Tympanic)  O2 SATS - 97%        DIAGNOSIS  Final diagnoses:   Acute gastritis, presence of bleeding unspecified, unspecified gastritis type   Epigastric pain         DISPOSITION  DISCHARGE    Patient discharged in stable condition.    Reviewed implications of results, diagnosis, meds, responsibility to follow up, warning signs and symptoms of possible worsening, potential complications and reasons to return to ER.    Patient/Family voiced understanding of above instructions.    Discussed plan for discharge, as there is no emergent indication for admission. Patient referred to primary care provider for BP management due to today's BP. Pt/family is agreeable and understands need for follow up and repeat testing.  Pt is aware that discharge does not mean that nothing is wrong but it indicates no emergency is present that requires admission and they must continue care with follow-up as given below or physician of their choice.     FOLLOW-UP  Moustapha  Gregg DORAN MD  [address] [id number] [phone]    Schedule an appointment as soon as possible for a visit   For further evaluation and treatment         Medication List      New Prescriptions    pantoprazole 40 MG EC tablet  Commonly known as: PROTONIX  Take 1 tablet by mouth Daily.     sucralfate 1 g tablet  Commonly known as: CARAFATE  Take 1 tablet by mouth 4 (Four) Times a Day.           Where to Get Your Medications      You can get these medications from any pharmacy    Bring a paper prescription for each of these medications  · pantoprazole 40 MG EC tablet  · sucralfate 1 g tablet                Cresencio Randle III, PA  04/14/21 0681

## 2021-04-14 NOTE — ED NOTES
I wore full protective equipment throughout this patient encounter including a face mask, goggles, and gloves. Hand hygiene was performed before donning protective equipment and after removal when leaving the room.       Alexus Rios RN  04/14/21 9656

## 2021-04-14 NOTE — ED PROVIDER NOTES
The ALEXANDRA and I have discussed this patients history, physical exam, and treatment plan. I have reviewed the documentation and personally had a face to face interaction with the patient. I affirm the documentation and agree with the treatment and plan.  The following note describes my personal findings    This patient is a 31-year-old female presenting to the emergency room with upper abdominal discomfort that has been present quite frequently over the past year.  She states it is in the epigastric region and is worse with feeding.  She has been taking famotidine without improvement thus far.    Exam: This patient is resting comfortably and in no distress, without gross neurological deficit.  She is afebrile with stable vital signs and nontoxic in appearance.  Cardiovascular exam shows a regular rate and rhythm, without murmur.  Abdomen is soft with mild tenderness to palpation to the epigastrium.  There is no rebound or guarding present.    Plan: We will obtain labs as well as a CT scan of the abdomen and pelvis for further assessment.  We will reassess following.      The patient was wearing a facemask upon entrance into the room and remained in such throughout their visit.  I was wearing PPE including a facemask, eye protection, as well as gloves at any point entering the room and throughout the visit     Amarjit Grace MD  04/14/21 5694

## 2021-04-14 NOTE — ED TRIAGE NOTES
"Pt to ED from home with c/o epigastric pain x a few months.  Pt reports going to [facility] on Friday and was prescribed pepcid, and states, \"it's not working.\" Pt reports n/v.    Pt wearing mask, staff wearing appropriate PPE.    "

## 2021-04-20 ENCOUNTER — OFFICE VISIT (OUTPATIENT)
Dept: GASTROENTEROLOGY | Facility: CLINIC | Age: 31
End: 2021-04-20

## 2021-04-20 ENCOUNTER — TELEPHONE (OUTPATIENT)
Dept: GASTROENTEROLOGY | Facility: CLINIC | Age: 31
End: 2021-04-20

## 2021-04-20 VITALS — TEMPERATURE: 97 F | HEIGHT: 66 IN | WEIGHT: 130 LBS | BODY MASS INDEX: 20.89 KG/M2

## 2021-04-20 DIAGNOSIS — R10.13 EPIGASTRIC PAIN: ICD-10-CM

## 2021-04-20 DIAGNOSIS — K21.9 GASTROESOPHAGEAL REFLUX DISEASE, UNSPECIFIED WHETHER ESOPHAGITIS PRESENT: Primary | ICD-10-CM

## 2021-04-20 PROCEDURE — 99203 OFFICE O/P NEW LOW 30 MIN: CPT | Performed by: INTERNAL MEDICINE

## 2021-04-20 RX ORDER — FLUTICASONE PROPIONATE 50 MCG
SPRAY, SUSPENSION (ML) NASAL
COMMUNITY
Start: 2021-03-18 | End: 2023-01-26

## 2021-04-20 RX ORDER — PANTOPRAZOLE SODIUM 40 MG/1
40 TABLET, DELAYED RELEASE ORAL DAILY
Qty: 90 TABLET | Refills: 3 | Status: SHIPPED | OUTPATIENT
Start: 2021-04-20 | End: 2022-06-17 | Stop reason: SDUPTHER

## 2021-04-20 NOTE — PROGRESS NOTES
Chief Complaint   Patient presents with   • Abdominal Pain   • Nausea   • Vomiting     Alyx Davis is a 31 y.o. female who presents with a history of epigastric pain with nausea and vomiting  HPI     Patient 31-year-old female with past medical history of UTI presented with a year of intermittent epigastric pain.  Patient reports pain seems to worsen with eating does take ibuprofen but not daily.  Patient reports pain increased with eating improved with fasting seen in the ER last week.  Patient had been evaluated by urgent care and given Pepcid with no improvement in the ER she was changed to Protonix and Carafate.  Since starting these new medications her symptoms have improved but still symptomatic.  Patient here for further recommendations.  Patient has noted some mild weight loss.  No fever chills no melena or bright red blood per rectum seen episodes of vomiting associated with vomiting of food but no coffee grounds or blood noted.    History reviewed. No pertinent past medical history.    Current Outpatient Medications:   •  fluticasone (FLONASE) 50 MCG/ACT nasal spray, USE 1 SQUIRT IN EACH NOSTRIL EVERY DAY, Disp: , Rfl:   •  pantoprazole (PROTONIX) 40 MG EC tablet, Take 1 tablet by mouth Daily., Disp: 90 tablet, Rfl: 3  •  sucralfate (CARAFATE) 1 g tablet, Take 1 tablet by mouth 4 (Four) Times a Day., Disp: 28 tablet, Rfl: 0  •  NON FORMULARY, Birth control pill, Disp: , Rfl:   •  sulfamethoxazole-trimethoprim (BACTRIM DS,SEPTRA DS) 800-160 MG per tablet, Take 1 tablet by mouth 2 (Two) Times a Day., Disp: 10 tablet, Rfl: 0  No Known Allergies  Social History     Socioeconomic History   • Marital status:      Spouse name: Not on file   • Number of children: Not on file   • Years of education: Not on file   • Highest education level: Not on file   Tobacco Use   • Smoking status: Never Smoker   • Smokeless tobacco: Never Used   Substance and Sexual Activity   • Alcohol use: No   • Drug use: Never      History reviewed. No pertinent family history.  Review of Systems   Constitutional: Negative.    Eyes: Negative.    Respiratory: Negative.    Gastrointestinal: Positive for abdominal pain, nausea and vomiting. Negative for abdominal distention, anal bleeding, blood in stool, constipation, diarrhea and rectal pain.   Endocrine: Negative.    Musculoskeletal: Negative.    Skin: Negative.    Allergic/Immunologic: Negative.    Neurological: Positive for headaches.   Hematological: Negative.      Vitals:    04/20/21 1052   Temp: 97 °F (36.1 °C)     Physical Exam  Diagnoses and all orders for this visit:    Gastroesophageal reflux disease, unspecified whether esophagitis present  -     Case Request; Standing  -     Implement Anesthesia orders day of procedure.; Standing  -     Obtain informed consent; Standing  -     Case Request    Epigastric pain  -     Case Request; Standing  -     Implement Anesthesia orders day of procedure.; Standing  -     Obtain informed consent; Standing  -     Case Request    Other orders  -     fluticasone (FLONASE) 50 MCG/ACT nasal spray; USE 1 SQUIRT IN EACH NOSTRIL EVERY DAY  -     pantoprazole (PROTONIX) 40 MG EC tablet; Take 1 tablet by mouth Daily.    Patient 31-year-old female with history of UTI in the past presented to the ER with epigastric pain radiating to the back.  Patient reports symptoms going on almost a year.  Pain increased with food some improvement with fasting.  Patient does have pain through the night.  Patient with some nausea when it is at its worst but reports no melena or bright red blood per rectum.  Patient has had some mild weight loss noted.  Patient started on Pepcid by urgent care with no improvement.  Seen by ER last week started on Protonix and Carafate is feeling some improvement.  Symptoms consistent with reflux versus ulcer disease.  Would recommend patient proceed with EGD to evaluate source.  Patient does take ibuprofen and not infrequently.  Patient  using it for headaches as needed.  We will continue the Protonix letter finish the course of Carafate.  Arrange EGD next week for further recommendations.

## 2021-04-22 ENCOUNTER — TRANSCRIBE ORDERS (OUTPATIENT)
Dept: SLEEP MEDICINE | Facility: HOSPITAL | Age: 31
End: 2021-04-22

## 2021-04-22 DIAGNOSIS — Z01.818 OTHER SPECIFIED PRE-OPERATIVE EXAMINATION: Primary | ICD-10-CM

## 2021-04-23 ENCOUNTER — TELEPHONE (OUTPATIENT)
Dept: GASTROENTEROLOGY | Facility: CLINIC | Age: 31
End: 2021-04-23

## 2021-04-23 NOTE — TELEPHONE ENCOUNTER
----- Message from Jayshree Garcia Rep sent at 4/23/2021  1:45 PM EDT -----  Regarding: pantoprazole (PROTONIX) 40 MG EC tablet/ sucralfate (CARAFATE) 1 g tablet  Contact: [phone]  PT needs a refill on prescription she is currently our of meds, please refill       pantoprazole (PROTONIX) 40 MG EC tablet/ sucralfate (CARAFATE) 1 g tablet      [id number] - [facility] [address] AT [facility] [address] [facility] - [phone] [facility] [phone]

## 2021-04-26 ENCOUNTER — LAB (OUTPATIENT)
Dept: LAB | Facility: HOSPITAL | Age: 31
End: 2021-04-26

## 2021-04-26 DIAGNOSIS — Z01.818 OTHER SPECIFIED PRE-OPERATIVE EXAMINATION: ICD-10-CM

## 2021-04-26 PROCEDURE — U0004 COV-19 TEST NON-CDC HGH THRU: HCPCS

## 2021-04-26 PROCEDURE — C9803 HOPD COVID-19 SPEC COLLECT: HCPCS

## 2021-04-27 LAB — SARS-COV-2 RNA RESP QL NAA+PROBE: NOT DETECTED

## 2021-04-28 ENCOUNTER — ANESTHESIA (OUTPATIENT)
Dept: GASTROENTEROLOGY | Facility: HOSPITAL | Age: 31
End: 2021-04-28

## 2021-04-28 ENCOUNTER — HOSPITAL ENCOUNTER (OUTPATIENT)
Facility: HOSPITAL | Age: 31
Setting detail: HOSPITAL OUTPATIENT SURGERY
Discharge: HOME | End: 2021-04-28
Attending: INTERNAL MEDICINE | Admitting: INTERNAL MEDICINE

## 2021-04-28 ENCOUNTER — ANESTHESIA EVENT (OUTPATIENT)
Dept: GASTROENTEROLOGY | Facility: HOSPITAL | Age: 31
End: 2021-04-28

## 2021-04-28 VITALS
RESPIRATION RATE: 16 BRPM | HEIGHT: 66 IN | SYSTOLIC BLOOD PRESSURE: 155 MMHG | DIASTOLIC BLOOD PRESSURE: 107 MMHG | BODY MASS INDEX: 20.04 KG/M2 | HEART RATE: 57 BPM | WEIGHT: 124.7 LBS | OXYGEN SATURATION: 100 %

## 2021-04-28 DIAGNOSIS — R10.13 EPIGASTRIC PAIN: ICD-10-CM

## 2021-04-28 DIAGNOSIS — K21.9 GASTROESOPHAGEAL REFLUX DISEASE, UNSPECIFIED WHETHER ESOPHAGITIS PRESENT: ICD-10-CM

## 2021-04-28 LAB
B-HCG UR QL: NEGATIVE
INTERNAL NEGATIVE CONTROL: NEGATIVE
INTERNAL POSITIVE CONTROL: POSITIVE
Lab: NORMAL

## 2021-04-28 PROCEDURE — 88305 TISSUE EXAM BY PATHOLOGIST: CPT | Performed by: INTERNAL MEDICINE

## 2021-04-28 PROCEDURE — 81025 URINE PREGNANCY TEST: CPT | Performed by: INTERNAL MEDICINE

## 2021-04-28 PROCEDURE — 43239 EGD BIOPSY SINGLE/MULTIPLE: CPT | Performed by: INTERNAL MEDICINE

## 2021-04-28 PROCEDURE — 25010000002 PROPOFOL 10 MG/ML EMULSION: Performed by: NURSE ANESTHETIST, CERTIFIED REGISTERED

## 2021-04-28 RX ORDER — SODIUM CHLORIDE, SODIUM LACTATE, POTASSIUM CHLORIDE, CALCIUM CHLORIDE 600; 310; 30; 20 MG/100ML; MG/100ML; MG/100ML; MG/100ML
INJECTION, SOLUTION INTRAVENOUS CONTINUOUS PRN
Status: DISCONTINUED | OUTPATIENT
Start: 2021-04-28 | End: 2021-04-28 | Stop reason: SURG

## 2021-04-28 RX ORDER — PROPOFOL 10 MG/ML
VIAL (ML) INTRAVENOUS CONTINUOUS PRN
Status: DISCONTINUED | OUTPATIENT
Start: 2021-04-28 | End: 2021-04-28 | Stop reason: SURG

## 2021-04-28 RX ORDER — GLYCOPYRROLATE 0.2 MG/ML
INJECTION INTRAMUSCULAR; INTRAVENOUS AS NEEDED
Status: DISCONTINUED | OUTPATIENT
Start: 2021-04-28 | End: 2021-04-28 | Stop reason: SURG

## 2021-04-28 RX ORDER — SODIUM CHLORIDE, SODIUM LACTATE, POTASSIUM CHLORIDE, CALCIUM CHLORIDE 600; 310; 30; 20 MG/100ML; MG/100ML; MG/100ML; MG/100ML
30 INJECTION, SOLUTION INTRAVENOUS CONTINUOUS PRN
Status: DISCONTINUED | OUTPATIENT
Start: 2021-04-28 | End: 2021-04-28 | Stop reason: HOSPADM

## 2021-04-28 RX ORDER — PROPOFOL 10 MG/ML
VIAL (ML) INTRAVENOUS AS NEEDED
Status: DISCONTINUED | OUTPATIENT
Start: 2021-04-28 | End: 2021-04-28 | Stop reason: SURG

## 2021-04-28 RX ORDER — LIDOCAINE HYDROCHLORIDE 20 MG/ML
INJECTION, SOLUTION INFILTRATION; PERINEURAL AS NEEDED
Status: DISCONTINUED | OUTPATIENT
Start: 2021-04-28 | End: 2021-04-28 | Stop reason: SURG

## 2021-04-28 RX ADMIN — PROPOFOL 50 MG: 10 INJECTION, EMULSION INTRAVENOUS at 14:56

## 2021-04-28 RX ADMIN — SODIUM CHLORIDE, POTASSIUM CHLORIDE, SODIUM LACTATE AND CALCIUM CHLORIDE 30 ML/HR: 600; 310; 30; 20 INJECTION, SOLUTION INTRAVENOUS at 13:57

## 2021-04-28 RX ADMIN — PROPOFOL 300 MCG/KG/MIN: 10 INJECTION, EMULSION INTRAVENOUS at 14:56

## 2021-04-28 RX ADMIN — GLYCOPYRROLATE 0.2 MG: 0.2 INJECTION INTRAMUSCULAR; INTRAVENOUS at 14:46

## 2021-04-28 RX ADMIN — LIDOCAINE HYDROCHLORIDE 60 MG: 20 INJECTION, SOLUTION INFILTRATION; PERINEURAL at 14:46

## 2021-04-28 RX ADMIN — SODIUM CHLORIDE, POTASSIUM CHLORIDE, SODIUM LACTATE AND CALCIUM CHLORIDE: 600; 310; 30; 20 INJECTION, SOLUTION INTRAVENOUS at 14:39

## 2021-04-28 NOTE — ANESTHESIA POSTPROCEDURE EVALUATION
"Patient: Alyx Davis    Procedure Summary     Date: 04/28/21 Room / Location: [facility] ENDOSCOPY 8 / [facility] ENDOSCOPY    Anesthesia Start: 1439 Anesthesia Stop: 1511    Procedure: ESOPHAGOGASTRODUODENOSCOPY with cold biopsies (N/A Esophagus) Diagnosis:       Gastroesophageal reflux disease, unspecified whether esophagitis present      Epigastric pain      (Gastroesophageal reflux disease, unspecified whether esophagitis present [K21.9])      (Epigastric pain [R10.13])    Surgeons: Jewel Burkett MD Provider: Max Landon MD    Anesthesia Type: MAC ASA Status: 2          Anesthesia Type: MAC    Vitals  Vitals Value Taken Time   /96 04/28/21 1522   Temp     Pulse 64 04/28/21 1522   Resp 16 04/28/21 1522   SpO2 99 % 04/28/21 1522           Post Anesthesia Care and Evaluation    Level of consciousness: awake and alert  Pain management: adequate  Anesthetic complications: No anesthetic complications    Cardiovascular status: acceptable  Respiratory status: acceptable  Hydration status: acceptable    Comments: /96 (BP Location: Left arm, Patient Position: Lying)   Pulse 64   Resp 16   Ht 167.6 cm (66\")   Wt 56.6 kg (124 lb 11.2 oz)   LMP 04/23/2021   SpO2 99%   BMI 20.13 kg/m²         "

## 2021-04-28 NOTE — ANESTHESIA PREPROCEDURE EVALUATION
Anesthesia Evaluation     no history of anesthetic complications:  NPO Solid Status: > 8 hours  NPO Liquid Status: > 2 hours           Airway   Mallampati: II  Dental      Pulmonary - normal exam   Cardiovascular - normal exam        Neuro/Psych  GI/Hepatic/Renal/Endo    (+)  GERD,      Musculoskeletal     Abdominal    Substance History      OB/GYN          Other                        Anesthesia Plan    ASA 2     MAC       Anesthetic plan, all risks, benefits, and alternatives have been provided, discussed and informed consent has been obtained with: patient.

## 2021-04-30 LAB
LAB AP CASE REPORT: NORMAL
LAB AP CLINICAL INFORMATION: NORMAL
LAB AP DIAGNOSIS COMMENT: NORMAL
PATH REPORT.FINAL DX SPEC: NORMAL
PATH REPORT.GROSS SPEC: NORMAL

## 2021-05-07 ENCOUNTER — TELEPHONE (OUTPATIENT)
Dept: GASTROENTEROLOGY | Facility: CLINIC | Age: 31
End: 2021-05-07

## 2021-05-07 NOTE — TELEPHONE ENCOUNTER
----- Message from Jayshree Garcia Rep sent at 5/6/2021 12:06 PM EDT -----  Regarding: sucralfate (CARAFATE) 1 g tablet  Contact: [phone]  PT states that pharmacy needs prescription for sucralfate (CARAFATE) 1 g tablet  verified, please call Pharmacy           [facility] #[id number] - [place], [place] - [address] AT [facility] [address] [facility] [phone] [facility] [phone]

## 2021-05-24 RX ORDER — SUCRALFATE 1 G/1
TABLET ORAL
Qty: 28 TABLET | Refills: 0 | Status: SHIPPED | OUTPATIENT
Start: 2021-05-24 | End: 2021-12-17

## 2021-06-08 ENCOUNTER — TELEPHONE (OUTPATIENT)
Dept: GASTROENTEROLOGY | Facility: CLINIC | Age: 31
End: 2021-06-08

## 2021-06-08 NOTE — TELEPHONE ENCOUNTER
----- Message from Jewel Burkett MD sent at 5/13/2021  1:05 PM EDT -----  Biopsies negative for celiac, arrange right upper quadrant ultrasound to rule out symptomatic gallstones.

## 2021-06-11 ENCOUNTER — TELEPHONE (OUTPATIENT)
Dept: GASTROENTEROLOGY | Facility: CLINIC | Age: 31
End: 2021-06-11

## 2021-06-11 DIAGNOSIS — R10.11 RIGHT UPPER QUADRANT PAIN: Primary | ICD-10-CM

## 2021-06-11 NOTE — TELEPHONE ENCOUNTER
----- Message from Jayshree Baltazar sent at 6/11/2021 11:36 AM EDT -----  Regarding: call back  Contact: [phone]  Pt is calling back.

## 2021-06-11 NOTE — TELEPHONE ENCOUNTER
Per Dr Burkett - Biopsies negative for celiac, arrange right upper quadrant ultrasound to rule out symptomatic gallstones.

## 2021-12-17 RX ORDER — SUCRALFATE 1 G/1
TABLET ORAL
Qty: 28 TABLET | Refills: 0 | Status: SHIPPED | OUTPATIENT
Start: 2021-12-17 | End: 2021-12-21

## 2021-12-21 RX ORDER — SUCRALFATE 1 G/1
TABLET ORAL
Qty: 28 TABLET | Refills: 0 | Status: SHIPPED | OUTPATIENT
Start: 2021-12-21 | End: 2022-07-26

## 2021-12-28 ENCOUNTER — HOSPITAL ENCOUNTER (EMERGENCY)
Facility: HOSPITAL | Age: 31
Discharge: HOME | End: 2021-12-28
Attending: EMERGENCY MEDICINE | Admitting: EMERGENCY MEDICINE

## 2021-12-28 VITALS
OXYGEN SATURATION: 100 % | TEMPERATURE: 99.2 F | HEART RATE: 74 BPM | HEIGHT: 66 IN | DIASTOLIC BLOOD PRESSURE: 78 MMHG | RESPIRATION RATE: 18 BRPM | BODY MASS INDEX: 20.09 KG/M2 | SYSTOLIC BLOOD PRESSURE: 109 MMHG | WEIGHT: 125 LBS

## 2021-12-28 DIAGNOSIS — U07.1 COVID-19: Primary | ICD-10-CM

## 2021-12-28 LAB
ALBUMIN SERPL-MCNC: 4 G/DL (ref 3.5–5.2)
ALBUMIN/GLOB SERPL: 1.5 G/DL
ALP SERPL-CCNC: 86 U/L (ref 39–117)
ALT SERPL W P-5'-P-CCNC: 13 U/L (ref 1–33)
ANION GAP SERPL CALCULATED.3IONS-SCNC: 8.2 MMOL/L (ref 5–15)
AST SERPL-CCNC: 16 U/L (ref 1–32)
B PARAPERT DNA SPEC QL NAA+PROBE: NOT DETECTED
B PERT DNA SPEC QL NAA+PROBE: NOT DETECTED
BACTERIA UR QL AUTO: ABNORMAL /HPF
BASOPHILS # BLD AUTO: 0.01 10*3/MM3 (ref 0–0.2)
BASOPHILS NFR BLD AUTO: 0.2 % (ref 0–1.5)
BILIRUB SERPL-MCNC: 0.2 MG/DL (ref 0–1.2)
BILIRUB UR QL STRIP: NEGATIVE
BUN SERPL-MCNC: 11 MG/DL (ref 6–20)
BUN/CREAT SERPL: 13.4 (ref 7–25)
C PNEUM DNA NPH QL NAA+NON-PROBE: NOT DETECTED
CALCIUM SPEC-SCNC: 8.9 MG/DL (ref 8.6–10.5)
CHLORIDE SERPL-SCNC: 104 MMOL/L (ref 98–107)
CK SERPL-CCNC: 47 U/L (ref 20–180)
CLARITY UR: CLEAR
CO2 SERPL-SCNC: 23.8 MMOL/L (ref 22–29)
COLOR UR: YELLOW
CREAT SERPL-MCNC: 0.82 MG/DL (ref 0.57–1)
DEPRECATED RDW RBC AUTO: 42.8 FL (ref 37–54)
EOSINOPHIL # BLD AUTO: 0.05 10*3/MM3 (ref 0–0.4)
EOSINOPHIL NFR BLD AUTO: 1.1 % (ref 0.3–6.2)
ERYTHROCYTE [DISTWIDTH] IN BLOOD BY AUTOMATED COUNT: 12.2 % (ref 12.3–15.4)
FLUAV SUBTYP SPEC NAA+PROBE: NOT DETECTED
FLUBV RNA ISLT QL NAA+PROBE: NOT DETECTED
GFR SERPL CREATININE-BSD FRML MDRD: 100 ML/MIN/1.73
GLOBULIN UR ELPH-MCNC: 2.7 GM/DL
GLUCOSE SERPL-MCNC: 88 MG/DL (ref 65–99)
GLUCOSE UR STRIP-MCNC: NEGATIVE MG/DL
HADV DNA SPEC NAA+PROBE: NOT DETECTED
HCG SERPL QL: NEGATIVE
HCOV 229E RNA SPEC QL NAA+PROBE: NOT DETECTED
HCOV HKU1 RNA SPEC QL NAA+PROBE: NOT DETECTED
HCOV NL63 RNA SPEC QL NAA+PROBE: NOT DETECTED
HCOV OC43 RNA SPEC QL NAA+PROBE: NOT DETECTED
HCT VFR BLD AUTO: 37.7 % (ref 34–46.6)
HGB BLD-MCNC: 12.3 G/DL (ref 12–15.9)
HGB UR QL STRIP.AUTO: NEGATIVE
HMPV RNA NPH QL NAA+NON-PROBE: NOT DETECTED
HPIV1 RNA ISLT QL NAA+PROBE: NOT DETECTED
HPIV2 RNA SPEC QL NAA+PROBE: NOT DETECTED
HPIV3 RNA NPH QL NAA+PROBE: NOT DETECTED
HPIV4 P GENE NPH QL NAA+PROBE: NOT DETECTED
HYALINE CASTS UR QL AUTO: ABNORMAL /LPF
IMM GRANULOCYTES # BLD AUTO: 0.01 10*3/MM3 (ref 0–0.05)
IMM GRANULOCYTES NFR BLD AUTO: 0.2 % (ref 0–0.5)
KETONES UR QL STRIP: NEGATIVE
LEUKOCYTE ESTERASE UR QL STRIP.AUTO: ABNORMAL
LYMPHOCYTES # BLD AUTO: 0.74 10*3/MM3 (ref 0.7–3.1)
LYMPHOCYTES NFR BLD AUTO: 16.9 % (ref 19.6–45.3)
M PNEUMO IGG SER IA-ACNC: NOT DETECTED
MCH RBC QN AUTO: 30.8 PG (ref 26.6–33)
MCHC RBC AUTO-ENTMCNC: 32.6 G/DL (ref 31.5–35.7)
MCV RBC AUTO: 94.3 FL (ref 79–97)
MONOCYTES # BLD AUTO: 0.63 10*3/MM3 (ref 0.1–0.9)
MONOCYTES NFR BLD AUTO: 14.4 % (ref 5–12)
NEUTROPHILS NFR BLD AUTO: 2.94 10*3/MM3 (ref 1.7–7)
NEUTROPHILS NFR BLD AUTO: 67.2 % (ref 42.7–76)
NITRITE UR QL STRIP: NEGATIVE
NRBC BLD AUTO-RTO: 0 /100 WBC (ref 0–0.2)
PH UR STRIP.AUTO: 7.5 [PH] (ref 5–8)
PLATELET # BLD AUTO: 219 10*3/MM3 (ref 140–450)
PMV BLD AUTO: 10.3 FL (ref 6–12)
POTASSIUM SERPL-SCNC: 3.7 MMOL/L (ref 3.5–5.2)
PROT SERPL-MCNC: 6.7 G/DL (ref 6–8.5)
PROT UR QL STRIP: ABNORMAL
RBC # BLD AUTO: 4 10*6/MM3 (ref 3.77–5.28)
RBC # UR STRIP: ABNORMAL /HPF
REF LAB TEST METHOD: ABNORMAL
RHINOVIRUS RNA SPEC NAA+PROBE: NOT DETECTED
RSV RNA NPH QL NAA+NON-PROBE: NOT DETECTED
SARS-COV-2 RNA NPH QL NAA+NON-PROBE: DETECTED
SODIUM SERPL-SCNC: 136 MMOL/L (ref 136–145)
SP GR UR STRIP: 1.03 (ref 1–1.03)
SQUAMOUS #/AREA URNS HPF: ABNORMAL /HPF
UROBILINOGEN UR QL STRIP: ABNORMAL
WBC # UR STRIP: ABNORMAL /HPF
WBC NRBC COR # BLD: 4.38 10*3/MM3 (ref 3.4–10.8)

## 2021-12-28 PROCEDURE — 36415 COLL VENOUS BLD VENIPUNCTURE: CPT

## 2021-12-28 PROCEDURE — 0202U NFCT DS 22 TRGT SARS-COV-2: CPT | Performed by: EMERGENCY MEDICINE

## 2021-12-28 PROCEDURE — 84703 CHORIONIC GONADOTROPIN ASSAY: CPT | Performed by: EMERGENCY MEDICINE

## 2021-12-28 PROCEDURE — 82550 ASSAY OF CK (CPK): CPT | Performed by: EMERGENCY MEDICINE

## 2021-12-28 PROCEDURE — 80053 COMPREHEN METABOLIC PANEL: CPT | Performed by: PHYSICIAN ASSISTANT

## 2021-12-28 PROCEDURE — 81001 URINALYSIS AUTO W/SCOPE: CPT | Performed by: EMERGENCY MEDICINE

## 2021-12-28 PROCEDURE — 99283 EMERGENCY DEPT VISIT LOW MDM: CPT

## 2021-12-28 PROCEDURE — 85025 COMPLETE CBC W/AUTO DIFF WBC: CPT | Performed by: PHYSICIAN ASSISTANT

## 2022-06-17 ENCOUNTER — TELEPHONE (OUTPATIENT)
Dept: GASTROENTEROLOGY | Facility: CLINIC | Age: 32
End: 2022-06-17

## 2022-06-17 RX ORDER — PANTOPRAZOLE SODIUM 40 MG/1
40 TABLET, DELAYED RELEASE ORAL DAILY
Qty: 90 TABLET | Refills: 0 | Status: SHIPPED | OUTPATIENT
Start: 2022-06-17 | End: 2022-07-26

## 2022-06-17 NOTE — TELEPHONE ENCOUNTER
Received fax from [facility] pharmacy requesting Pantoprazole refill.   90 day refill sent with no refills. Patient needs f/u office visit.

## 2022-07-10 ENCOUNTER — HOSPITAL ENCOUNTER (EMERGENCY)
Facility: HOSPITAL | Age: 32
Discharge: HOME | End: 2022-07-10
Attending: EMERGENCY MEDICINE | Admitting: EMERGENCY MEDICINE

## 2022-07-10 ENCOUNTER — APPOINTMENT (OUTPATIENT)
Dept: ULTRASOUND IMAGING | Facility: HOSPITAL | Age: 32
End: 2022-07-10

## 2022-07-10 VITALS
DIASTOLIC BLOOD PRESSURE: 80 MMHG | HEART RATE: 59 BPM | WEIGHT: 124 LBS | RESPIRATION RATE: 26 BRPM | SYSTOLIC BLOOD PRESSURE: 115 MMHG | OXYGEN SATURATION: 100 % | BODY MASS INDEX: 19.93 KG/M2 | TEMPERATURE: 98.2 F | HEIGHT: 66 IN

## 2022-07-10 DIAGNOSIS — O20.9 VAGINAL BLEEDING AFFECTING EARLY PREGNANCY: ICD-10-CM

## 2022-07-10 DIAGNOSIS — Z32.01 POSITIVE PREGNANCY TEST: Primary | ICD-10-CM

## 2022-07-10 LAB
ABO GROUP BLD: NORMAL
ALBUMIN SERPL-MCNC: 3.9 G/DL (ref 3.5–5.2)
ALBUMIN/GLOB SERPL: 1.4 G/DL
ALP SERPL-CCNC: 78 U/L (ref 39–117)
ALT SERPL W P-5'-P-CCNC: 12 U/L (ref 1–33)
ANION GAP SERPL CALCULATED.3IONS-SCNC: 10.7 MMOL/L (ref 5–15)
AST SERPL-CCNC: 15 U/L (ref 1–32)
BACTERIA UR QL AUTO: ABNORMAL /HPF
BASOPHILS # BLD AUTO: 0.03 10*3/MM3 (ref 0–0.2)
BASOPHILS NFR BLD AUTO: 0.4 % (ref 0–1.5)
BILIRUB SERPL-MCNC: 0.7 MG/DL (ref 0–1.2)
BILIRUB UR QL STRIP: NEGATIVE
BILIRUB UR QL STRIP: NEGATIVE
BUN SERPL-MCNC: 11 MG/DL (ref 6–20)
BUN/CREAT SERPL: 18.3 (ref 7–25)
CALCIUM SPEC-SCNC: 8.7 MG/DL (ref 8.6–10.5)
CHLORIDE SERPL-SCNC: 105 MMOL/L (ref 98–107)
CLARITY UR: ABNORMAL
CLARITY UR: CLEAR
CO2 SERPL-SCNC: 22.3 MMOL/L (ref 22–29)
COLOR UR: ABNORMAL
COLOR UR: YELLOW
CREAT SERPL-MCNC: 0.6 MG/DL (ref 0.57–1)
DEPRECATED RDW RBC AUTO: 38.8 FL (ref 37–54)
EGFRCR SERPLBLD CKD-EPI 2021: 124 ML/MIN/1.73
EOSINOPHIL # BLD AUTO: 0.02 10*3/MM3 (ref 0–0.4)
EOSINOPHIL NFR BLD AUTO: 0.3 % (ref 0.3–6.2)
ERYTHROCYTE [DISTWIDTH] IN BLOOD BY AUTOMATED COUNT: 11.8 % (ref 12.3–15.4)
GLOBULIN UR ELPH-MCNC: 2.7 GM/DL
GLUCOSE SERPL-MCNC: 144 MG/DL (ref 65–99)
GLUCOSE UR STRIP-MCNC: NEGATIVE MG/DL
GLUCOSE UR STRIP-MCNC: NEGATIVE MG/DL
HCG INTACT+B SERPL-ACNC: 142 MIU/ML
HCG SERPL QL: POSITIVE
HCT VFR BLD AUTO: 33.7 % (ref 34–46.6)
HGB BLD-MCNC: 11.7 G/DL (ref 12–15.9)
HGB UR QL STRIP.AUTO: ABNORMAL
HGB UR QL STRIP.AUTO: NEGATIVE
HOLD SPECIMEN: NORMAL
HOLD SPECIMEN: NORMAL
HYALINE CASTS UR QL AUTO: ABNORMAL /LPF
IMM GRANULOCYTES # BLD AUTO: 0.02 10*3/MM3 (ref 0–0.05)
IMM GRANULOCYTES NFR BLD AUTO: 0.3 % (ref 0–0.5)
KETONES UR QL STRIP: ABNORMAL
KETONES UR QL STRIP: ABNORMAL
LEUKOCYTE ESTERASE UR QL STRIP.AUTO: ABNORMAL
LEUKOCYTE ESTERASE UR QL STRIP.AUTO: NEGATIVE
LIPASE SERPL-CCNC: 14 U/L (ref 13–60)
LYMPHOCYTES # BLD AUTO: 0.93 10*3/MM3 (ref 0.7–3.1)
LYMPHOCYTES NFR BLD AUTO: 12.6 % (ref 19.6–45.3)
MCH RBC QN AUTO: 31.5 PG (ref 26.6–33)
MCHC RBC AUTO-ENTMCNC: 34.7 G/DL (ref 31.5–35.7)
MCV RBC AUTO: 90.8 FL (ref 79–97)
MONOCYTES # BLD AUTO: 0.38 10*3/MM3 (ref 0.1–0.9)
MONOCYTES NFR BLD AUTO: 5.2 % (ref 5–12)
NEUTROPHILS NFR BLD AUTO: 5.99 10*3/MM3 (ref 1.7–7)
NEUTROPHILS NFR BLD AUTO: 81.2 % (ref 42.7–76)
NITRITE UR QL STRIP: NEGATIVE
NITRITE UR QL STRIP: NEGATIVE
NRBC BLD AUTO-RTO: 0 /100 WBC (ref 0–0.2)
PH UR STRIP.AUTO: 5.5 [PH] (ref 5–8)
PH UR STRIP.AUTO: >=9 [PH] (ref 5–8)
PLATELET # BLD AUTO: 236 10*3/MM3 (ref 140–450)
PMV BLD AUTO: 9.9 FL (ref 6–12)
POTASSIUM SERPL-SCNC: 3.1 MMOL/L (ref 3.5–5.2)
PROT SERPL-MCNC: 6.6 G/DL (ref 6–8.5)
PROT UR QL STRIP: ABNORMAL
PROT UR QL STRIP: NEGATIVE
RBC # BLD AUTO: 3.71 10*6/MM3 (ref 3.77–5.28)
RBC # UR STRIP: ABNORMAL /HPF
REF LAB TEST METHOD: ABNORMAL
RH BLD: POSITIVE
SODIUM SERPL-SCNC: 138 MMOL/L (ref 136–145)
SP GR UR STRIP: >=1.03 (ref 1–1.03)
SP GR UR STRIP: >=1.03 (ref 1–1.03)
SQUAMOUS #/AREA URNS HPF: ABNORMAL /HPF
UROBILINOGEN UR QL STRIP: ABNORMAL
UROBILINOGEN UR QL STRIP: ABNORMAL
WBC # UR STRIP: ABNORMAL /HPF
WBC NRBC COR # BLD: 7.37 10*3/MM3 (ref 3.4–10.8)
WHOLE BLOOD HOLD COAG: NORMAL
WHOLE BLOOD HOLD SPECIMEN: NORMAL

## 2022-07-10 PROCEDURE — 76815 OB US LIMITED FETUS(S): CPT

## 2022-07-10 PROCEDURE — 76817 TRANSVAGINAL US OBSTETRIC: CPT

## 2022-07-10 PROCEDURE — 86900 BLOOD TYPING SEROLOGIC ABO: CPT | Performed by: NURSE PRACTITIONER

## 2022-07-10 PROCEDURE — 81003 URINALYSIS AUTO W/O SCOPE: CPT | Performed by: NURSE PRACTITIONER

## 2022-07-10 PROCEDURE — 84703 CHORIONIC GONADOTROPIN ASSAY: CPT | Performed by: NURSE PRACTITIONER

## 2022-07-10 PROCEDURE — 83690 ASSAY OF LIPASE: CPT

## 2022-07-10 PROCEDURE — 80053 COMPREHEN METABOLIC PANEL: CPT

## 2022-07-10 PROCEDURE — 36415 COLL VENOUS BLD VENIPUNCTURE: CPT

## 2022-07-10 PROCEDURE — 93976 VASCULAR STUDY: CPT

## 2022-07-10 PROCEDURE — 84702 CHORIONIC GONADOTROPIN TEST: CPT | Performed by: NURSE PRACTITIONER

## 2022-07-10 PROCEDURE — P9612 CATHETERIZE FOR URINE SPEC: HCPCS

## 2022-07-10 PROCEDURE — 81001 URINALYSIS AUTO W/SCOPE: CPT

## 2022-07-10 PROCEDURE — 85025 COMPLETE CBC W/AUTO DIFF WBC: CPT

## 2022-07-10 PROCEDURE — 86901 BLOOD TYPING SEROLOGIC RH(D): CPT | Performed by: NURSE PRACTITIONER

## 2022-07-10 PROCEDURE — 99283 EMERGENCY DEPT VISIT LOW MDM: CPT

## 2022-07-10 RX ORDER — SODIUM CHLORIDE 0.9 % (FLUSH) 0.9 %
10 SYRINGE (ML) INJECTION AS NEEDED
Status: DISCONTINUED | OUTPATIENT
Start: 2022-07-10 | End: 2022-07-10 | Stop reason: HOSPADM

## 2022-07-10 RX ORDER — PNV NO.95/FERROUS FUM/FOLIC AC 28MG-0.8MG
1 TABLET ORAL DAILY
Qty: 30 TABLET | Refills: 0 | Status: SHIPPED | OUTPATIENT
Start: 2022-07-10

## 2022-07-10 RX ADMIN — SODIUM CHLORIDE 1000 ML: 9 INJECTION, SOLUTION INTRAVENOUS at 08:20
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
## 2022-07-10 NOTE — DISCHARGE INSTRUCTIONS
Vaginal rest, nothing in your vaginal until follow up with your OB/GYN, you should call to schedule an appointment within the next 24-48 hours to have Quant and Ultrasound repeated.  Increase your water intake  You should return to the hospital if you continue to have persistent and heavy vaginal bleeding, persistent pelvic pain not relieved by your prescribed medications, dizziness, shortness of breath, new and persistent fevers, other foul smelling discolored vaginal discharge, or for any other concerns.
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
## 2022-07-10 NOTE — ED PROVIDER NOTES
The ALEXANDRA and I have discussed this patient's history, physical exam and treatment plan.  I provided a substantive portion of the care of this patient.  I have reviewed the documentation and personally had a face to face interaction with the patient and personally performed the physical exam, in its entirety.  I affirm the documentation and agree with the treatment and plan.  The following describes my personal findings.      The patient presents complaining of abdominal pain persistent/worsening for the past week.  Pt reports vomiting last week, denies fever, dysuria, CP, SOA, weakness/numbness/incontinence.  Pt reports she started her period today, irregular.    Comprehensive Physical exam:  Patient is nontoxic appearing conversant, oriented, awake, alert  HEENT: normocephalic, atraumatic  Neck: no JVD,no goiter, no pain with ROM  Pulmonary:chest wall nonTTPalp, breath sounds heard well B, nontachypneic  cardiovascular: nontachycardic, B radial/PT pulses palp  Abdomen: soft, pos BS, mild TTPalp diffuse, greatest across entire lower abd  musculoskeletal: good ROM, pulse sens x 4  Neuro/psychiatric:calm, appropriate, cooperative  Skin:warm, dry    Menstrual bleeding x 1 day, lower abd pain, HCG less than 142, irreg menses, no IUP, suspect discomfort from threatened miscarriage.  Pt has been counseled of risk of ectopic which could be life-threatening or disabling and absolute need to follow closely with gynecology for further testing/treatment.  Low suspicion for sequelae of remote trauma as etiology of patient's discomfort.    Patient was wearing facemask when I entered the room and throughout our encounter. Full protective equipment was worn throughout this patient encounter including a face mask, eye protection and gloves. Hand hygiene was performed before donning protective equipment and after removal when leaving the room.           Estella Gallego MD  07/26/22 [id number]

## 2022-07-12 ENCOUNTER — TELEPHONE (OUTPATIENT)
Dept: OBSTETRICS AND GYNECOLOGY | Age: 32
End: 2022-07-12

## 2022-07-12 NOTE — TELEPHONE ENCOUNTER
----- Message from Darrick Edwards MD sent at 7/12/2022  5:01 PM EDT -----  Regarding: FW: follow up  Please add patient on with me on Thursday or Friday of this week.  ----- Message -----  From: Catherine Mayfield APRN  Sent: 7/10/2022   1:19 PM EDT  To: Darrick Edwards MD  Subject: follow up                                        Just wanting to make sure this patient gets close follow-up. Thank you

## 2022-07-14 ENCOUNTER — OFFICE VISIT (OUTPATIENT)
Dept: OBSTETRICS AND GYNECOLOGY | Age: 32
End: 2022-07-14

## 2022-07-14 VITALS
DIASTOLIC BLOOD PRESSURE: 64 MMHG | WEIGHT: 122 LBS | SYSTOLIC BLOOD PRESSURE: 108 MMHG | HEIGHT: 66 IN | BODY MASS INDEX: 19.61 KG/M2

## 2022-07-14 DIAGNOSIS — O20.9 BLEEDING IN EARLY PREGNANCY: Primary | ICD-10-CM

## 2022-07-14 PROCEDURE — 99203 OFFICE O/P NEW LOW 30 MIN: CPT | Performed by: OBSTETRICS & GYNECOLOGY

## 2022-07-14 RX ORDER — IBUPROFEN 600 MG/1
600 TABLET ORAL EVERY 8 HOURS
COMMUNITY
Start: 2022-06-16 | End: 2022-07-26

## 2022-07-14 NOTE — PROGRESS NOTES
"  Chief complaint-miscarriage    History of present illness- Patient is a 32 y.o.  who was told to come see me after seeing the ER physician at [facility].  Patient came in with bleeding in pregnancy.  She had a low beta hCG at 142.  Patient subsequently went to a [place] ER and the beta-hCG was decreased to 69.  Patient had bleeding that is tapering off.  Her blood type is O+.    Patient reports that she has had infertility for 7 years previously.  She has oligomenorrhea with cycles only every 4 to 12 months.  She does not have any hair growth or nipple discharge.  She reports she has had these highly spaced out.  Since age 19.  She is  and here today with her .  She is interested in pregnancy.         /64   Ht 167.6 cm (66\")   Wt 55.3 kg (122 lb)   LMP 07/10/2022 (Exact Date)   Breastfeeding No   BMI 19.69 kg/m²   Physical Exam  Constitutional:       Appearance: Normal appearance.   Neurological:      Mental Status: She is alert.   Psychiatric:         Mood and Affect: Mood normal.         Thought Content: Thought content normal.         Judgment: Judgment normal.             Diagnoses and all orders for this visit:    1. Bleeding in early pregnancy (Primary)  -     HCG, B-subunit, Quantitative  -     Follicle Stimulating Hormone  -     TSH  -     Rubella Antibody, IgG  -     Varicella Zoster Antibody, IgG    Beta-hCG decreases consistent with spontaneous miscarriage.  We will recheck beta-hCG today.  Patient does have a history of infertility.  She will come back in about 2 weeks and we will look at her labs and do her Pap.  We could discuss possible ovulation induction at that time.  "

## 2022-07-15 LAB
FSH SERPL-ACNC: 5.1 MIU/ML
HCG INTACT+B SERPL-ACNC: 21 MIU/ML
RUBV IGG SERPL IA-ACNC: 3.66 INDEX
TSH SERPL DL<=0.005 MIU/L-ACNC: 1.53 UIU/ML (ref 0.45–4.5)
VZV IGG SER IA-ACNC: 662 INDEX

## 2022-07-26 ENCOUNTER — OFFICE VISIT (OUTPATIENT)
Dept: OBSTETRICS AND GYNECOLOGY | Age: 32
End: 2022-07-26

## 2022-07-26 VITALS
HEIGHT: 66 IN | DIASTOLIC BLOOD PRESSURE: 60 MMHG | BODY MASS INDEX: 19.61 KG/M2 | WEIGHT: 122 LBS | SYSTOLIC BLOOD PRESSURE: 104 MMHG

## 2022-07-26 DIAGNOSIS — Z31.9 INFERTILITY MANAGEMENT: ICD-10-CM

## 2022-07-26 DIAGNOSIS — Z11.51 SPECIAL SCREENING EXAMINATION FOR HUMAN PAPILLOMAVIRUS (HPV): ICD-10-CM

## 2022-07-26 DIAGNOSIS — Z12.4 SCREENING FOR MALIGNANT NEOPLASM OF THE CERVIX: ICD-10-CM

## 2022-07-26 DIAGNOSIS — N93.9 ABNORMAL UTERINE BLEEDING (AUB): Primary | ICD-10-CM

## 2022-07-26 PROCEDURE — 99213 OFFICE O/P EST LOW 20 MIN: CPT | Performed by: OBSTETRICS & GYNECOLOGY

## 2022-07-26 RX ORDER — LETROZOLE 2.5 MG/1
2.5 TABLET, FILM COATED ORAL DAILY
Qty: 5 TABLET | Refills: 5 | Status: SHIPPED | OUTPATIENT
Start: 2022-07-26 | End: 2023-01-26

## 2022-07-26 RX ORDER — MEDROXYPROGESTERONE ACETATE 10 MG/1
10 TABLET ORAL DAILY
Qty: 10 TABLET | Refills: 0 | Status: SHIPPED | OUTPATIENT
Start: 2022-07-26 | End: 2022-08-03

## 2022-07-26 NOTE — PROGRESS NOTES
"Subjective     Chief Complaint   Patient presents with   • Follow-up     Gyn f/u recent SAB       History of Present Illness    Alyx Davis is a 32 y.o.  who presents for follow-up of spontaneous AB.  Patient also noted that she had a 7-year history of infertility.  She has irregular cycles that occur about every 2 to 3 months.  She had follow-up beta-hCG which did decline from 69-21 consistent with miscarriage.  Did check some labs which came back normal.  She is here today with her  and they are interested in pursuing fertility treatments.  She has not not had a Pap smear.  She denies any pain with intercourse.  She denies any history of STDs.  Her menses are regular every 8-12  weeks, lasting 4-7 days, dysmenorrhea none   Obstetric History:  OB History        2    Para        Term                AB   1    Living           SAB   1    IAB        Ectopic        Molar        Multiple        Live Births                   Menstrual History:     Patient's last menstrual period was 07/10/2022 (exact date).         Current contraception: none  History of abnormal Pap smear: no  Received Gardasil immunization: no  Perform regular self breast exam : no  Family history of uterine or ovarian cancer: no  Family History of colon cancer: no  Family history of breast cancer: no        The following portions of the patient's history were reviewed and updated as appropriate: allergies, current medications, past family history, past medical history, past social history, past surgical history and problem list.    Review of Systems    Review of Systems   Constitutional: Negative for fatigue.   Respiratory: Negative for shortness of breath.    Gastrointestinal: Negative for abdominal pain.   Genitourinary: Negative for dysuria.   Neurological: Negative for headaches.   Psychiatric/Behavioral: Negative for dysphoric mood.     Objective   Physical Exam    /60   Ht 167.6 cm (66\")   Wt 55.3 kg (122 " lb)   LMP 07/10/2022 (Exact Date)   Breastfeeding No   BMI 19.69 kg/m²     General:   alert, appears stated age and cooperative   Neck: thyroid normal to palpation   Heart: regular rate and rhythm   Lungs: clear to auscultation bilaterally   Abdomen: soft, non-tender, without masses or organomegaly   Breast: inspection negative, no nipple discharge or bleeding, no masses or nodularity palpable   Vulva: normal, Bartholin's, Urethra, Kingman's normal   Vagina: normal mucosa, normal discharge   Cervix: no cervical motion tenderness and no lesions   Uterus: non-tender, normal shape and consistency   Adnexa: no mass, fullness, tenderness   Rectal: not indicated     Assessment & Plan   Diagnoses and all orders for this visit:    1. Abnormal uterine bleeding (AUB) (Primary)    2. Infertility management    3. Screening for malignant neoplasm of the cervix  -     IGP,CtNg,AptimaHPV,rfx16 / 18,45    4. Special screening examination for human papillomavirus (HPV)  -     IGP,CtNg,AptimaHPV,rfx16 / 18,45    Other orders  -     letrozole (Femara) 2.5 MG tablet; Take 1 tablet by mouth Daily. On days 3-7 of cycle  Dispense: 5 tablet; Refill: 5  -     medroxyPROGESTERone (Provera) 10 MG tablet; Take 1 tablet by mouth Daily.  Dispense: 10 tablet; Refill: 0    Pap smear was sent today  Patient has history of irregular cycles.  She has had 7 years of infertility.  Suspect anovulation.  Patient will start checking ovulation predictor kits.  We discussed option of ovulation induction with Femara.  We discussed 10 to 15% risk of twins and risk of ovarian cyst.  Patient wishes to proceed.  She will do a Provera withdrawal to start a cycle.  We will then start the Femara.  Patient will call with any problems and follow-up with me in 6 months.  Her  will be sent for semen analysis.    All questions answered.

## 2022-07-30 LAB
C TRACH RRNA CVX QL NAA+PROBE: NEGATIVE
CYTOLOGIST CVX/VAG CYTO: ABNORMAL
CYTOLOGY CVX/VAG DOC CYTO: ABNORMAL
CYTOLOGY CVX/VAG DOC THIN PREP: ABNORMAL
DX ICD CODE: ABNORMAL
HIV 1 & 2 AB SER-IMP: ABNORMAL
HPV I/H RISK 4 DNA CVX QL PROBE+SIG AMP: POSITIVE
HPV16 DNA CVX QL PROBE+SIG AMP: NEGATIVE
HPV18+45 E6+E7 MRNA CVX QL NAA+PROBE: NEGATIVE
N GONORRHOEA RRNA CVX QL NAA+PROBE: NEGATIVE
OTHER STN SPEC: ABNORMAL
STAT OF ADQ CVX/VAG CYTO-IMP: ABNORMAL

## 2022-08-01 PROBLEM — B97.7 HPV IN FEMALE: Status: ACTIVE | Noted: 2022-08-01

## 2022-08-03 RX ORDER — MEDROXYPROGESTERONE ACETATE 10 MG/1
10 TABLET ORAL DAILY
Qty: 10 TABLET | Refills: 0 | Status: SHIPPED | OUTPATIENT
Start: 2022-08-03 | End: 2022-08-10

## 2022-08-10 RX ORDER — MEDROXYPROGESTERONE ACETATE 10 MG/1
10 TABLET ORAL DAILY
Qty: 10 TABLET | Refills: 0 | Status: SHIPPED | OUTPATIENT
Start: 2022-08-10 | End: 2022-09-07

## 2022-09-07 RX ORDER — MEDROXYPROGESTERONE ACETATE 10 MG/1
10 TABLET ORAL DAILY
Qty: 10 TABLET | Refills: 0 | Status: SHIPPED | OUTPATIENT
Start: 2022-09-07 | End: 2022-09-14

## 2022-09-14 RX ORDER — MEDROXYPROGESTERONE ACETATE 10 MG/1
10 TABLET ORAL DAILY
Qty: 10 TABLET | Refills: 0 | Status: SHIPPED | OUTPATIENT
Start: 2022-09-14 | End: 2023-01-26 | Stop reason: SDUPTHER

## 2023-01-26 ENCOUNTER — OFFICE VISIT (OUTPATIENT)
Dept: OBSTETRICS AND GYNECOLOGY | Age: 33
End: 2023-01-26
Payer: COMMERCIAL

## 2023-01-26 VITALS
BODY MASS INDEX: 20.57 KG/M2 | WEIGHT: 128 LBS | DIASTOLIC BLOOD PRESSURE: 66 MMHG | HEIGHT: 66 IN | SYSTOLIC BLOOD PRESSURE: 108 MMHG

## 2023-01-26 DIAGNOSIS — Z32.00 ENCOUNTER FOR PREGNANCY TEST, RESULT UNKNOWN: Primary | ICD-10-CM

## 2023-01-26 DIAGNOSIS — Z31.9 INFERTILITY MANAGEMENT: ICD-10-CM

## 2023-01-26 DIAGNOSIS — N93.9 ABNORMAL UTERINE BLEEDING (AUB): ICD-10-CM

## 2023-01-26 LAB
B-HCG UR QL: NEGATIVE
EXPIRATION DATE: NORMAL
INTERNAL NEGATIVE CONTROL: NEGATIVE
INTERNAL POSITIVE CONTROL: POSITIVE
Lab: NORMAL

## 2023-01-26 PROCEDURE — 99213 OFFICE O/P EST LOW 20 MIN: CPT | Performed by: OBSTETRICS & GYNECOLOGY

## 2023-01-26 PROCEDURE — 81025 URINE PREGNANCY TEST: CPT | Performed by: OBSTETRICS & GYNECOLOGY

## 2023-01-26 RX ORDER — MEDROXYPROGESTERONE ACETATE 10 MG/1
10 TABLET ORAL DAILY
Qty: 10 TABLET | Refills: 0 | Status: SHIPPED | OUTPATIENT
Start: 2023-01-26 | End: 2023-04-06

## 2023-01-26 RX ORDER — LETROZOLE 2.5 MG/1
5 TABLET, FILM COATED ORAL DAILY
Qty: 10 TABLET | Refills: 2 | Status: SHIPPED | OUTPATIENT
Start: 2023-01-26 | End: 2023-04-06

## 2023-01-26 NOTE — PROGRESS NOTES
"  Chief complaint-infertility    History of present illness- Patient is a 33 y.o.  who is here for follow-up.  Patient has had a long duration of infertility.  I saw her when she had a miscarriage.  She reports she has about 7 years of infertility.  She was not willing to go to the fertility specialists at that time.  We triggered a cycle with Provera and she started taking Femara 6 months ago.  She reports that she ovulated in August only but the other months she did not ovulate.  She did not call back.  She did not have a menses since December but has noticed some dark light bleeding today.  Office pregnancy test was negative.  Her  is willing to do a semen analysis.        /66   Ht 167.6 cm (66\")   Wt 58.1 kg (128 lb)   BMI 20.66 kg/m²   Physical Exam  Constitutional:       Appearance: Normal appearance.   Neurological:      Mental Status: She is alert.   Psychiatric:         Mood and Affect: Mood normal.         Thought Content: Thought content normal.         Judgment: Judgment normal.              Diagnoses and all orders for this visit:    1. Encounter for pregnancy test, result unknown (Primary)  -     POC Pregnancy, Urine    2. Infertility management  -     Ambulatory Referral to Infertility    3. Abnormal uterine bleeding (AUB)    Other orders  -     letrozole (Femara) 2.5 MG tablet; Take 2 tablets by mouth Daily. On days 3-7 of cycle  Dispense: 10 tablet; Refill: 2  -     medroxyPROGESTERone (PROVERA) 10 MG tablet; Take 1 tablet by mouth Daily.  Dispense: 10 tablet; Refill: 0    I recommend that the patient see Dr. Schultz from infertility.  She is in agreement.  She ovulated only with one of the Femara cycles so will increase her to 5 mg on days 3 through 7 of her cycle.  She will do a Provera withdrawal now to stimulate a menses.  We will set up a semen analysis for her  through Dr. Schultz's office.  Continue prenatal vitamins.    Patient's Pap smear was normal but positive " HPV.  Type 16,18 and 45 were negative.  She will need a repeat Pap smear in July.  We will schedule that today.

## 2023-04-06 ENCOUNTER — INITIAL PRENATAL (OUTPATIENT)
Dept: OBSTETRICS AND GYNECOLOGY | Age: 33
End: 2023-04-06
Payer: COMMERCIAL

## 2023-04-06 VITALS — WEIGHT: 135 LBS | SYSTOLIC BLOOD PRESSURE: 108 MMHG | DIASTOLIC BLOOD PRESSURE: 70 MMHG | BODY MASS INDEX: 21.79 KG/M2

## 2023-04-06 DIAGNOSIS — Z34.90 PREGNANCY, UNSPECIFIED GESTATIONAL AGE: ICD-10-CM

## 2023-04-06 DIAGNOSIS — Z13.89 SCREENING FOR BLOOD OR PROTEIN IN URINE: Primary | ICD-10-CM

## 2023-04-06 PROBLEM — K21.9 GASTROESOPHAGEAL REFLUX DISEASE: Status: RESOLVED | Noted: 2021-04-20 | Resolved: 2023-04-06

## 2023-04-06 PROBLEM — R10.13 EPIGASTRIC PAIN: Status: RESOLVED | Noted: 2021-04-20 | Resolved: 2023-04-06

## 2023-04-06 LAB
GLUCOSE UR STRIP-MCNC: NEGATIVE MG/DL
PROT UR STRIP-MCNC: NEGATIVE MG/DL

## 2023-04-06 NOTE — PROGRESS NOTES
Patient is a 33-year-old  2 para 0-0-1-0 at 7 weeks 3 days by ultrasound today.  Patient did have irregular cycles.  She had been trying for pregnancy for a while and had infertility.  Her last pregnancy was an early missed AB.  Patient has positive HPV but cells on Pap smear were normal and type 16,18 and 45 were negative.  She was due for repeat Pap in about August.  Patient is taking prenatal vitamins.  She is here today with her .  She has some mild fatigue but is otherwise feeling better.    Full history reviewed    Ultrasound shows viable intrauterine pregnancy    Exam-see exam tab    Assessment-7 weeks  New OB information was discussed in detail with the patient today and folder given  New OB labs today  We discussed options for genetic testing.  Patient is considering first trimester testing and carrier testing.  We will discuss more at her next visit.  Follow-up in 3 weeks.

## 2023-04-07 LAB
ABO GROUP BLD: NORMAL
BASOPHILS # BLD AUTO: 0 X10E3/UL (ref 0–0.2)
BASOPHILS NFR BLD AUTO: 1 %
BLD GP AB SCN SERPL QL: NEGATIVE
EOSINOPHIL # BLD AUTO: 0.1 X10E3/UL (ref 0–0.4)
EOSINOPHIL NFR BLD AUTO: 2 %
ERYTHROCYTE [DISTWIDTH] IN BLOOD BY AUTOMATED COUNT: 12.9 % (ref 11.7–15.4)
HBV SURFACE AG SERPL QL IA: NEGATIVE
HCT VFR BLD AUTO: 36.7 % (ref 34–46.6)
HCV IGG SERPL QL IA: NON REACTIVE
HGB A MFR BLD ELPH: 97 % (ref 96.4–98.8)
HGB A2 MFR BLD ELPH: 3 % (ref 1.8–3.2)
HGB BLD-MCNC: 12.4 G/DL (ref 11.1–15.9)
HGB F MFR BLD ELPH: 0 % (ref 0–2)
HGB FRACT BLD-IMP: NORMAL
HGB S MFR BLD ELPH: 0 %
HIV 1+2 AB+HIV1 P24 AG SERPL QL IA: NON REACTIVE
IMM GRANULOCYTES # BLD AUTO: 0 X10E3/UL (ref 0–0.1)
IMM GRANULOCYTES NFR BLD AUTO: 0 %
LYMPHOCYTES # BLD AUTO: 2.1 X10E3/UL (ref 0.7–3.1)
LYMPHOCYTES NFR BLD AUTO: 30 %
MCH RBC QN AUTO: 30.7 PG (ref 26.6–33)
MCHC RBC AUTO-ENTMCNC: 33.8 G/DL (ref 31.5–35.7)
MCV RBC AUTO: 91 FL (ref 79–97)
MONOCYTES # BLD AUTO: 0.4 X10E3/UL (ref 0.1–0.9)
MONOCYTES NFR BLD AUTO: 6 %
NEUTROPHILS # BLD AUTO: 4.3 X10E3/UL (ref 1.4–7)
NEUTROPHILS NFR BLD AUTO: 61 %
PLATELET # BLD AUTO: 272 X10E3/UL (ref 150–450)
RBC # BLD AUTO: 4.04 X10E6/UL (ref 3.77–5.28)
RH BLD: POSITIVE
RPR SER QL: NON REACTIVE
RUBV IGG SERPL IA-ACNC: 3.15 INDEX
WBC # BLD AUTO: 7.1 X10E3/UL (ref 3.4–10.8)

## 2023-04-08 LAB
BACTERIA UR CULT: NO GROWTH
BACTERIA UR CULT: NORMAL
C TRACH RRNA SPEC QL NAA+PROBE: NEGATIVE
N GONORRHOEA RRNA SPEC QL NAA+PROBE: NEGATIVE

## 2023-04-25 ENCOUNTER — ROUTINE PRENATAL (OUTPATIENT)
Dept: OBSTETRICS AND GYNECOLOGY | Age: 33
End: 2023-04-25
Payer: COMMERCIAL

## 2023-04-25 VITALS — SYSTOLIC BLOOD PRESSURE: 102 MMHG | WEIGHT: 135.6 LBS | DIASTOLIC BLOOD PRESSURE: 58 MMHG | BODY MASS INDEX: 21.89 KG/M2

## 2023-04-25 DIAGNOSIS — Z34.01 ENCOUNTER FOR SUPERVISION OF NORMAL FIRST PREGNANCY IN FIRST TRIMESTER: ICD-10-CM

## 2023-04-25 DIAGNOSIS — Z13.89 SCREENING FOR BLOOD OR PROTEIN IN URINE: ICD-10-CM

## 2023-04-25 DIAGNOSIS — Z3A.10 10 WEEKS GESTATION OF PREGNANCY: Primary | ICD-10-CM

## 2023-04-25 PROBLEM — Z34.90 PREGNANCY: Status: ACTIVE | Noted: 2023-04-25

## 2023-04-25 LAB
CLARITY, POC: CLEAR
COLOR UR: YELLOW
GLUCOSE UR STRIP-MCNC: NEGATIVE MG/DL
PROT UR STRIP-MCNC: NEGATIVE MG/DL

## 2023-04-25 RX ORDER — PNV NO.95/FERROUS FUM/FOLIC AC 28MG-0.8MG
1 TABLET ORAL DAILY
Qty: 90 TABLET | Refills: 3 | Status: SHIPPED | OUTPATIENT
Start: 2023-04-25

## 2023-04-25 NOTE — PROGRESS NOTES
Patient reports that she does not have much of an appetite but is drinking and eating a lot of snacks.  She is taking her prenatal vitamins.  No vaginal bleeding.  She and her  declined any genetic testing.    Hand-held ultrasound is used to visualize fetal movement  Doppler heart tones are positive  No weight gain for pregnancy so far.  Blood pressure 102/58 with no protein  New OB labs are reviewed and are normal.    Assessment-10 weeks  Encouraged regular meals  Offered genetic testing-patient declines  Follow-up in 4 weeks.

## 2023-05-05 ENCOUNTER — TELEPHONE (OUTPATIENT)
Dept: OBSTETRICS AND GYNECOLOGY | Age: 33
End: 2023-05-05
Payer: COMMERCIAL

## 2023-05-05 NOTE — TELEPHONE ENCOUNTER
Pt called, Has had a cough and sneezing for about a week, would like to know to know what she can take, gave her OTC recommendations for prenatal.

## 2023-05-07 ENCOUNTER — DOCUMENTATION (OUTPATIENT)
Dept: OBSTETRICS AND GYNECOLOGY | Age: 33
End: 2023-05-07
Payer: COMMERCIAL

## 2023-05-07 NOTE — PROGRESS NOTES
Received call from [facility] ED, pt presented there with abdominal and back pain. Exam is benign and labs are normal. No vaginal bleeding or cramping. Imaging not felt to be warranted by provider and advised she does have an IUP. Discussed if appropriate for DC can do so and have her call office to follow up this week

## 2023-05-25 ENCOUNTER — ROUTINE PRENATAL (OUTPATIENT)
Dept: OBSTETRICS AND GYNECOLOGY | Age: 33
End: 2023-05-25
Payer: COMMERCIAL

## 2023-05-25 VITALS — WEIGHT: 140 LBS | SYSTOLIC BLOOD PRESSURE: 108 MMHG | BODY MASS INDEX: 22.6 KG/M2 | DIASTOLIC BLOOD PRESSURE: 66 MMHG

## 2023-05-25 DIAGNOSIS — Z13.89 SCREENING FOR BLOOD OR PROTEIN IN URINE: Primary | ICD-10-CM

## 2023-05-25 DIAGNOSIS — Z34.90 PREGNANCY, UNSPECIFIED GESTATIONAL AGE: ICD-10-CM

## 2023-05-25 LAB
GLUCOSE UR STRIP-MCNC: NEGATIVE MG/DL
PROT UR STRIP-MCNC: NEGATIVE MG/DL

## 2023-05-25 NOTE — PROGRESS NOTES
Patient reports that she went to an ER at [facility] because she was not feeling well.  She wanted to know if it was okay to eat noodles in pregnancy.  She does have a diminished appetite.    Hand-held ultrasound is used to show the patient fetal movements  Doppler heart tones are positive  Blood pressure 108/66 with no protein  5 pounds of weight gain for pregnancy.    Assessment-14 weeks  Patient declines any genetic testing or AFP testing.  Discussed diet and regular meals  Discussed going to [facility] instead of other hospitals.

## 2023-06-16 ENCOUNTER — HOSPITAL ENCOUNTER (EMERGENCY)
Facility: HOSPITAL | Age: 33
Discharge: HOME | End: 2023-06-17
Attending: EMERGENCY MEDICINE | Admitting: OBSTETRICS & GYNECOLOGY
Payer: OTHER MISCELLANEOUS
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
## 2023-06-16 DIAGNOSIS — M54.50 ACUTE RIGHT-SIDED LOW BACK PAIN WITHOUT SCIATICA: ICD-10-CM

## 2023-06-16 PROCEDURE — 99283 EMERGENCY DEPT VISIT LOW MDM: CPT

## 2023-06-16 PROCEDURE — 99202 OFFICE O/P NEW SF 15 MIN: CPT | Performed by: OBSTETRICS & GYNECOLOGY

## 2023-06-17 VITALS
BODY MASS INDEX: 23.46 KG/M2 | HEIGHT: 66 IN | DIASTOLIC BLOOD PRESSURE: 75 MMHG | TEMPERATURE: 98.9 F | HEART RATE: 74 BPM | SYSTOLIC BLOOD PRESSURE: 124 MMHG | RESPIRATION RATE: 16 BRPM | OXYGEN SATURATION: 100 % | WEIGHT: 146 LBS

## 2023-06-17 NOTE — OBED NOTES
"[facility]  Alyx Davis  : 1990  MRN: [id number]  CSN: [id number]    OB ED Provider Note    Subjective   Chief Complaint   Patient presents with   • Back Pain     Pt states falling out of chair around 1930 this evening. States lower back pain. Denies hitting head or LOC      Alyx Davis is a 33 y.o. year old  with an Estimated Date of Delivery: 23 currently at 17w5d presenting after falling on her back and buttocks after falling out of a chair.  She denies VB or ROM.    Prenatal care has been with Dr. Edwards.  It has been benign.    OB History    Para Term  AB Living   2 0 0 0 1 0   SAB IAB Ectopic Molar Multiple Live Births   1 0 0 0 0 0      # Outcome Date GA Lbr Fausto/2nd Weight Sex Delivery Anes PTL Lv   2 Current            1 SAB 22 4w0d            Past Medical History:   Diagnosis Date   • Gastroesophageal reflux disease 2021   • GERD (gastroesophageal reflux disease)      Past Surgical History:   Procedure Laterality Date   • ENDOSCOPY N/A 2021    Procedure: ESOPHAGOGASTRODUODENOSCOPY with cold biopsies;  Surgeon: Jewel Burkett MD;  [facility]: [facility] ENDOSCOPY;  Service: Gastroenterology;  Laterality: N/A;  pre: nausea, vomitting and abdominal pain  post:normal     No current facility-administered medications for this encounter.    No Known Allergies  Social History    Tobacco Use      Smoking status: Never      Smokeless tobacco: Never    Review of Systems   All other systems reviewed and are negative.        Objective   /75 (BP Location: Right leg, Patient Position: Sitting)   Pulse 74   Temp 98.9 °F (37.2 °C) (Oral)   Resp 16   Ht 167.6 cm (66\")   Wt 66.2 kg (146 lb)   LMP 07/10/2022 (Exact Date)   SpO2 100%   Breastfeeding No   BMI 23.57 kg/m²   General: well developed; well nourished  no acute distress   Abdomen: soft, non-tender; no masses  gravid    FHT's: 150-155      Cervix: was not checked.   Presentation: unable to " appreciate   Contractions: not monitored   Chest: Unlabored respirations    CV:  RRR   Ext:   No C/C/E   Back: CVA tenderness is deferred bilateral        Prenatal Labs  Lab Results   Component Value Date    HGB 12.4 04/06/2023    RUBELLAABIGG 3.15 04/06/2023    HEPBSAG Negative 04/06/2023    ABORH O Positive 07/12/2022    ABSCRN Negative 04/06/2023    CUN9JXS5 Non Reactive 04/06/2023    HEPCVIRUSABY Non Reactive 04/06/2023    URINECX Final report 04/06/2023    CHLAMNAA Negative 04/06/2023    NGONORRHON Negative 04/06/2023       Current Labs Reviewed   Prenatal Panel:   Lab Results   Component Value Date    HGB 12.4 04/06/2023    RUBELLAABIGG 3.15 04/06/2023    HEPBSAG Negative 04/06/2023    ABORH O Positive 07/12/2022    ABSCRN Negative 04/06/2023    ECQ1HLC5 Non Reactive 04/06/2023    HEPCVIRUSABY Non Reactive 04/06/2023    URINECX Final report 04/06/2023    CHLAMNAA Negative 04/06/2023    NGONORRHON Negative 04/06/2023          Assessment   1. IUP at 17w5d  2. Fall without direct abdominal trauma- reassured patient.     Plan   1. D/C home. Return for worsening symptoms, acute changes.      Lucia Starr MD  6/17/2023  00:14 EDT

## 2023-06-17 NOTE — ED PROVIDER NOTES
EMERGENCY DEPARTMENT ENCOUNTER    Room Number:  OB09/1  PCP: Provider, No Known  Patient Care Team:  Provider, No Known as PCP - General   Independent Historians: Patient    HPI:  Chief Complaint: Fall    A complete HPI/ROS/PMH/PSH/SH/FH are unobtainable due to: None    Chronic or social conditions impacting patient care (Social Determinants of Health): None  (Financial Resource Strain / Food Insecurity / Transportation Needs / Physical Activity / Stress / Social Connections / Intimate Partner Violence / Housing Stability)    Context: Alyx Davis is a 33 y.o. female who presents to the ED c/o falling out of the chair work earlier today.  Patient is 17 weeks pregnant is G2, P0 proximately 17 weeks.  Patient had prior IUP confirmed at 7 weeks.  Patient does report a little bit of lower abdominal pain.  States that this has been baseline in the past but is concerned and wants make sure her baby is okay.  No loss of fluid no vaginal bleeding no tingling no numbness.    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    Pregnancy 04/25/2023     Resolved Ambulatory Problems     Diagnosis Date Noted    Gastroesophageal reflux disease 04/20/2021    Epigastric pain 04/20/2021     Past Medical History:   Diagnosis Date    GERD (gastroesophageal reflux disease)          PAST SURGICAL HISTORY  Past Surgical History:   Procedure Laterality Date    ENDOSCOPY N/A 4/28/2021    Procedure: ESOPHAGOGASTRODUODENOSCOPY with cold biopsies;  Surgeon: Jewel Burkett MD;  Location: [facility] ENDOSCOPY;  Service: Gastroenterology;  Laterality: N/A;  pre: nausea, vomitting and abdominal  pain  post:normal         FAMILY HISTORY  Family History   Problem Relation Age of Onset    No Known Problems Father     No Known Problems Mother     Cancer Neg Hx          SOCIAL HISTORY  Social History     Socioeconomic History    Marital status:      Spouse name: Wero   Tobacco Use    Smoking status: Never    Smokeless tobacco: Never   Vaping Use    Vaping Use: Never used   Substance and Sexual Activity    Alcohol use: No    Drug use: Never    Sexual activity: Yes     Partners: Male     Birth control/protection: None         ALLERGIES  Patient has no known allergies.        REVIEW OF SYSTEMS  Review of Systems  Included in HPI  All systems reviewed and negative except for those discussed in HPI.      PHYSICAL EXAM    I have reviewed the triage vital signs and nursing notes.    ED Triage Vitals   Temp Heart Rate Resp BP SpO2   06/16/23 2127 06/16/23 2127 06/16/23 2127 06/16/23 2156 06/16/23 2127   97.6 °F (36.4 °C) 71 16 120/82 100 %      Temp src Heart Rate Source Patient Position BP Location FiO2 (%)   06/16/23 2127 06/16/23 2127 06/16/23 2156 06/16/23 2156 --   Tympanic Monitor Lying Left arm        Physical Exam  GENERAL: alert, no acute distress  SKIN: Warm, dry  HENT: Normocephalic, atraumatic  EYES: no scleral icterus  CV: regular rhythm, regular rate  RESPIRATORY: normal effort, lungs clear  ABDOMEN: soft, very minimal lower abdominal tenderness no rebound or guarding, nondistended  MUSCULOSKELETAL: no deformity, mild right lateral lumbar soft tissue tenderness no bony tenderness negative straight leg raise bilaterally  NEURO: alert, moves all extremities, follows commands                                                               LAB RESULTS  No results found for this or any previous visit (from the past 24 hour(s)).    Ordered the above labs and independently reviewed the results.        RADIOLOGY  No Radiology Exams Resulted Within Past 24 Hours    I ordered the above noted radiological  studies. Reviewed by me and discussed with radiologist.  See dictation for official radiology interpretation.      PROCEDURES    Procedures      MEDICATIONS GIVEN IN ER    Medications - No data to display      ORDERS PLACED DURING THIS VISIT:  Orders Placed This Encounter   Procedures    Monitor fetal heart tones    Discharge patient         PROGRESS, DATA ANALYSIS, CONSULTS, AND MEDICAL DECISION MAKING    All labs have been independently interpreted by me.  All radiology studies have been reviewed by me and discussed with radiologist dictating the report.   EKG's independently viewed and interpreted by me.  Discussion below represents my analysis of pertinent findings related to patient's condition, differential diagnosis, treatment plan and final disposition.    Differential diagnosis includes but is not limited to strain, sprain, miscarriage.    ED Course as of 06/17/23 0721   Fri Jun 16, 2023 2231 First look: Patient is approximately 17 weeks pregnant, slipped and fell out of a chair and landed on her backside earlier this evening.  Able to ambulate fine.  No head injury or LOC.  No vaginal bleeding.  She came in today to make sure that her baby was okay.  Denies any significant abdominal pain.  We will plan for fetal heart tones to start with [DC]   2327 Discussed with OB hospitalist.  Will accept the pt to OB triage. [TJ]      ED Course User Index  [DC] Wero Jhaveri MD  [TJ] Tom Jarvis MD       I interpreted the cardiac monitor rhythm and my independent interpretation is: normal sinus rhythm.     PPE: The patient wore a mask and I wore an N95 mask throughout the entire patient encounter.       AS OF 07:21 EDT VITALS:    BP - 124/75  HR - 74  TEMP - 98.9 °F (37.2 °C) (Oral)  O2 SATS - 100%        DIAGNOSIS  Final diagnoses:   Fall, initial encounter   Acute right-sided low back pain without sciatica         DISPOSITION  ED Disposition       ED Disposition   Send to OB ED    Condition   --     Comment   --                  Note Disclaimer: At [facility], we believe that sharing information builds trust and better relationships. You are receiving this note because you recently visited [facility]. It is possible you will see health information before a provider has talked with you about it. This kind of information can be easy to misunderstand. To help you fully understand what it means for your health, we urge you to discuss this note with your provider.         Tom Jarvis MD  06/17/23 0721

## 2023-06-19 ENCOUNTER — TELEPHONE (OUTPATIENT)
Dept: OBSTETRICS AND GYNECOLOGY | Age: 33
End: 2023-06-19

## 2023-06-19 RX ORDER — OMEPRAZOLE 40 MG/1
40 CAPSULE, DELAYED RELEASE ORAL DAILY
Qty: 30 CAPSULE | Refills: 6 | Status: SHIPPED | OUTPATIENT
Start: 2023-06-19

## 2023-06-23 PROBLEM — K21.9 GASTROESOPHAGEAL REFLUX DISEASE WITHOUT ESOPHAGITIS: Status: ACTIVE | Noted: 2023-06-23

## 2023-07-31 ENCOUNTER — ROUTINE PRENATAL (OUTPATIENT)
Dept: OBSTETRICS AND GYNECOLOGY | Age: 33
End: 2023-07-31
Payer: COMMERCIAL

## 2023-07-31 VITALS — SYSTOLIC BLOOD PRESSURE: 120 MMHG | WEIGHT: 152 LBS | BODY MASS INDEX: 24.53 KG/M2 | DIASTOLIC BLOOD PRESSURE: 70 MMHG

## 2023-07-31 DIAGNOSIS — Z34.02 ENCOUNTER FOR SUPERVISION OF NORMAL FIRST PREGNANCY IN SECOND TRIMESTER: ICD-10-CM

## 2023-07-31 DIAGNOSIS — Z3A.24 24 WEEKS GESTATION OF PREGNANCY: ICD-10-CM

## 2023-07-31 DIAGNOSIS — Z13.89 SCREENING FOR BLOOD OR PROTEIN IN URINE: Primary | ICD-10-CM

## 2023-07-31 LAB
GLUCOSE UR STRIP-MCNC: NEGATIVE MG/DL
PROT UR STRIP-MCNC: NEGATIVE MG/DL

## 2023-08-01 ENCOUNTER — HOSPITAL ENCOUNTER (EMERGENCY)
Facility: HOSPITAL | Age: 33
Discharge: HOME | End: 2023-08-01
Attending: OBSTETRICS & GYNECOLOGY | Admitting: OBSTETRICS & GYNECOLOGY
Payer: COMMERCIAL

## 2023-08-01 VITALS
HEIGHT: 66 IN | TEMPERATURE: 98.1 F | BODY MASS INDEX: 24.53 KG/M2 | HEART RATE: 69 BPM | DIASTOLIC BLOOD PRESSURE: 72 MMHG | RESPIRATION RATE: 16 BRPM | OXYGEN SATURATION: 100 % | SYSTOLIC BLOOD PRESSURE: 113 MMHG

## 2023-08-01 PROCEDURE — 99283 EMERGENCY DEPT VISIT LOW MDM: CPT | Performed by: OBSTETRICS & GYNECOLOGY

## 2023-08-01 PROCEDURE — 59025 FETAL NON-STRESS TEST: CPT | Performed by: OBSTETRICS & GYNECOLOGY

## 2023-08-02 NOTE — OBED NOTES
[facility] Note OB        Patient Name: Alyx Davis  YOB: 1990  MRN: [id number]  Admission Date: 2023  8:27 PM  Date of Service: 2023    Chief Complaint: Decreased Fetal Movement (Pt came to [facility] for c/o DFM. Last movement was felt Saturday afternoon. Denies CTX, VB, LOF. )      Subjective     Alyx Davis is a 33 y.o. female  at 24w1d with Estimated Date of Delivery: 23 who presents due to decreased fetal movement for 3 days, last felt movement on Saturday, this is Tuesday. She sees Darrick Edwards MD for her prenatal care. Her pregnancy has been complicated by:  GERD.  She denies loss of fluid or vaginal bleeding. She is not jane.        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.7]     Abnormal uterine bleeding (AUB) [N93.9]     Infertility management [Z31.9]         OB History    Para Term  AB Living   2 0 0 0 1 0   SAB IAB Ectopic Molar Multiple Live Births   1 0 0 0 0 0      # Outcome Date GA Lbr Fausto/2nd Weight Sex Delivery Anes PTL Lv   2 Current            1 SAB 22 4w0d               Past Medical History:   Diagnosis Date    Gastroesophageal reflux disease 2021    GERD (gastroesophageal reflux disease)        Past Surgical History:   Procedure Laterality Date    ENDOSCOPY N/A 2021    Procedure: ESOPHAGOGASTRODUODENOSCOPY with cold biopsies;  Surgeon: Jewel Burkett MD;  Location: [facility] ENDOSCOPY;  Service: Gastroenterology;  Laterality: N/A;  pre: nausea, vomitting and abdominal pain  post:normal       No current facility-administered medications on file prior to encounter.     Current Outpatient Medications on File Prior to Encounter   Medication Sig Dispense Refill    Prenatal Vit-Fe Fumarate-FA (prenatal vitamin 28-0.8) 28-0.8 MG tablet tablet Take 1 tablet by mouth Daily. 90 tablet 3       No Known  "Allergies    Family History   Problem Relation Age of Onset    No Known Problems Father     No Known Problems Mother     Cancer Neg Hx        Social History     Socioeconomic History    Marital status:      Spouse name: Wero Mendez   Tobacco Use    Smoking status: Never    Smokeless tobacco: Never   Vaping Use    Vaping Use: Never used   Substance and Sexual Activity    Alcohol use: No    Drug use: Never    Sexual activity: Yes     Partners: Male     Birth control/protection: None           Review of Systems   Constitutional:  Negative for chills, fatigue and fever.   HENT: Negative.     Eyes:  Negative for photophobia and visual disturbance.   Respiratory:  Negative for cough, chest tightness and shortness of breath.    Cardiovascular:  Negative for chest pain and leg swelling.   Gastrointestinal:  Negative for abdominal pain, diarrhea, nausea and vomiting.   Genitourinary:  Negative for dysuria, flank pain, hematuria, pelvic pain, vaginal bleeding and vaginal discharge.   Musculoskeletal:  Negative for back pain.   Neurological:  Negative for dizziness, seizures, weakness and headaches.    Decreased fetal movement x 3 days    PHYSICAL EXAM:      VITAL SIGNS:  Vitals:    08/01/23 2048   BP: 110/72   BP Location: Right arm   Patient Position: Lying   Pulse: 71   Resp: 16   Temp: 98.1 øF (36.7 øC)   TempSrc: Oral   SpO2: 100%   Height: 167.6 cm (66\")        FHT'S:                   Baseline:  145 BPM  Variability:  Moderate = 6 - 25 BPM  Accelerations:  10 x 10 accelerations present     Decelerations:  absent  Contractions:   absent     Interpretation:   Reactive NST, CAT 1 tracing        PHYSICAL EXAM:    General: well developed; well nourished  no acute distress   Heart: Not performed.   Lungs  Back: breathing is unlabored  CVA tenderness is absent   Abdomen: soft, non-tender; no masses       Cervix: was not checked.      Contractions: none        Extremities: peripheral pulses normal, no pedal edema, " "no clubbing or cyanosis      LABS AND TESTING ORDERED:  Uterine and fetal monitoring  Urinalysis  NST    LAB RESULTS:    No results found for this or any previous visit (from the past 24 hour(s)).    Lab Results   Component Value Date    ABO O 2023    RH Positive 2023       No results found for: STREPGPB              Assessment & Plan   ASSESSMENT/PLAN:  Alyx Davis is a 33 y.o. female  at 24w1d who presented due to decreased fetal movement for 3 days.  She was placed on external fetal monitoring and was noted to have a Reactive NST, CAT 1 tracing demonstrating reassuring fetal status.  Movement was felt after she was placed on the monitor.  She had no evidence of active labor and in view of reassuring fetal status she will be discharged to home.      Final Impression:  Pregnancy at 24w1d presenting due to decreased fetal movement    Reactive NST, CAT   1 tracing, Reassuring fetal status, movement felt prior to discharge    Maternal vital signs were reviewed and were unremarkable                   Vitals:    238   BP: 110/72   BP Location: Right arm   Patient Position: Lying   Pulse: 71   Resp: 16   Temp: 98.1 øF (36.7 øC)   TempSrc: Oral   SpO2: 100%   Height: 167.6 cm (66\")         She will be discharged to home     PLAN:  Discharge to home  She will continue her home meds       Your medication list        CONTINUE taking these medications        Instructions Last Dose Given Next Dose Due   prenatal vitamin 28-0.8 28-0.8 MG tablet tablet      Take 1 tablet by mouth Daily.                  She will follow up with Darrick Edwards MD as scheduled and as needed.  I have asked the patient to call the office tomorrow to see if Dr Edwards would like to see her sooner than the scheduled 23 appointment  She has been instructed to return for contractions, vaginal bleeding, Rupture of membranes, decreased fetal movement or other concern  She may call the office or [facility] with questions.     "       I have spent 30 minutes including face to face time with the patient, greater than 50% in discussion of the diagnosis (counseling) and/or coordination of care.         Max Carroll MD  8/1/2023  21:11 EDT  OB Hospitalist  Phone:  [phone]

## 2023-08-11 ENCOUNTER — REFERRAL TRIAGE (OUTPATIENT)
Dept: LABOR AND DELIVERY | Facility: HOSPITAL | Age: 33
End: 2023-08-11
Payer: COMMERCIAL

## 2023-08-15 ENCOUNTER — PATIENT OUTREACH (OUTPATIENT)
Dept: LABOR AND DELIVERY | Facility: HOSPITAL | Age: 33
End: 2023-08-15
Payer: COMMERCIAL

## 2023-08-15 NOTE — OUTREACH NOTE
[facility]  Unable to Reach       Questions/Answers      Flowsheet Row Responses   Pending Outreach Confirm Patient Interest   Call Attempt First   Outcome Left message   Next Call Attempt Date 08/17/23            Adia BISHOP - RN  Maternity Nurse Navigator    8/15/2023, 14:26 EDT

## 2023-08-17 ENCOUNTER — PATIENT OUTREACH (OUTPATIENT)
Dept: LABOR AND DELIVERY | Facility: HOSPITAL | Age: 33
End: 2023-08-17
Payer: COMMERCIAL

## 2023-08-17 NOTE — OUTREACH NOTE
[facility]  Unable to Reach       Questions/Answers      Flowsheet Row Responses   Pending Outreach Confirm Patient Interest   Call Attempt Second   Outcome Left message          UTR for program, please reach out with any specific patient needs.     Adia BISHOP - RN  Maternity Nurse Navigator    8/17/2023, 11:31 EDT

## 2023-08-29 ENCOUNTER — ROUTINE PRENATAL (OUTPATIENT)
Dept: OBSTETRICS AND GYNECOLOGY | Age: 33
End: 2023-08-29
Payer: COMMERCIAL

## 2023-08-29 VITALS — DIASTOLIC BLOOD PRESSURE: 74 MMHG | SYSTOLIC BLOOD PRESSURE: 108 MMHG | BODY MASS INDEX: 24.86 KG/M2 | WEIGHT: 154 LBS

## 2023-08-29 DIAGNOSIS — Z34.03 ENCOUNTER FOR SUPERVISION OF NORMAL FIRST PREGNANCY IN THIRD TRIMESTER: ICD-10-CM

## 2023-08-29 DIAGNOSIS — Z13.0 SCREENING FOR IRON DEFICIENCY ANEMIA: ICD-10-CM

## 2023-08-29 DIAGNOSIS — Z13.89 SCREENING FOR BLOOD OR PROTEIN IN URINE: Primary | ICD-10-CM

## 2023-08-29 DIAGNOSIS — Z13.1 SCREENING FOR DIABETES MELLITUS: ICD-10-CM

## 2023-08-29 LAB
GLUCOSE UR STRIP-MCNC: NEGATIVE MG/DL
PROT UR STRIP-MCNC: NEGATIVE MG/DL

## 2023-08-29 NOTE — PROGRESS NOTES
The patient is concerned about pigmentation changes on the pubic area.  She is also noticed some bumps there.  She does shave the pubic area.  Baby is moving well.    Doppler heart tones are positive and fundal height is appropriate  Weight gain is normal  Blood pressure 108/74 no protein.  Pubic area is examined and typical hyperpigmentation of pregnancy is noted.  There is some small shaving bumps.    Assessments-28 weeks  Third trimester labs today.  Patient is agreeable to Tdap after counseling  Patient will bring in the name of the pediatrician  Check fetal weight at 32 weeks.

## 2023-08-30 LAB
ERYTHROCYTE [DISTWIDTH] IN BLOOD BY AUTOMATED COUNT: 13 % (ref 12.3–15.4)
GLUCOSE 1H P 50 G GLC PO SERPL-MCNC: 138 MG/DL (ref 65–139)
HCT VFR BLD AUTO: 30.7 % (ref 34–46.6)
HGB BLD-MCNC: 10.3 G/DL (ref 12–15.9)
MCH RBC QN AUTO: 30.1 PG (ref 26.6–33)
MCHC RBC AUTO-ENTMCNC: 33.6 G/DL (ref 31.5–35.7)
MCV RBC AUTO: 89.8 FL (ref 79–97)
PLATELET # BLD AUTO: 268 10*3/MM3 (ref 140–450)
RBC # BLD AUTO: 3.42 10*6/MM3 (ref 3.77–5.28)
WBC # BLD AUTO: 7.84 10*3/MM3 (ref 3.4–10.8)

## 2023-08-30 RX ORDER — FERROUS SULFATE 325(65) MG
325 TABLET ORAL
Qty: 60 TABLET | Refills: 6 | Status: SHIPPED | OUTPATIENT
Start: 2023-08-30

## 2023-09-05 ENCOUNTER — LAB (OUTPATIENT)
Dept: OBSTETRICS AND GYNECOLOGY | Age: 33
End: 2023-09-05
Payer: COMMERCIAL

## 2023-09-05 DIAGNOSIS — Z34.82 PRENATAL CARE, SUBSEQUENT PREGNANCY, SECOND TRIMESTER: Primary | ICD-10-CM

## 2023-09-05 LAB
GLUCOSE 1H P 100 G GLC PO SERPL-MCNC: 127 MG/DL (ref 65–179)
GLUCOSE 2H P 100 G GLC PO SERPL-MCNC: 118 MG/DL (ref 65–154)
GLUCOSE 3H P 100 G GLC PO SERPL-MCNC: 100 MG/DL (ref 65–139)
GLUCOSE P FAST SERPL-MCNC: 70 MG/DL (ref 65–94)

## 2023-09-06 ENCOUNTER — ROUTINE PRENATAL (OUTPATIENT)
Dept: OBSTETRICS AND GYNECOLOGY | Age: 33
End: 2023-09-06
Payer: COMMERCIAL

## 2023-09-06 VITALS — WEIGHT: 157 LBS | SYSTOLIC BLOOD PRESSURE: 118 MMHG | DIASTOLIC BLOOD PRESSURE: 74 MMHG | BODY MASS INDEX: 25.34 KG/M2

## 2023-09-06 DIAGNOSIS — Z3A.29 29 WEEKS GESTATION OF PREGNANCY: Primary | ICD-10-CM

## 2023-09-06 DIAGNOSIS — O36.8130 DECREASED FETAL MOVEMENTS IN THIRD TRIMESTER, SINGLE OR UNSPECIFIED FETUS: ICD-10-CM

## 2023-09-06 LAB
GLUCOSE UR STRIP-MCNC: NEGATIVE MG/DL
PROT UR STRIP-MCNC: ABNORMAL MG/DL

## 2023-09-06 NOTE — PROGRESS NOTES
Chief Complaint   Patient presents with    Pregnancy Problem     29 week ob, came in to the office today, not feeling baby move.       HPI: 33 y.o.  at 29w2d gestation  She arrived at the office without an appt noting DFM since last night  Has eaten and drank food and has not felt movement  Denies any other sx's  FHT obtained at visit and BPP was ordered  BPP was done and score was 8/8  It was noted that it took a while to get the FM per the u/s Racquel fox ,therefore we are going to add another BPP on the    I have given strict warning that if pt notes DFM, she should either contact our office or report to L&D    Vitals:    23 1558   BP: 118/74   Weight: 71.2 kg (157 lb)       ROS:  GI:  Negative  : na  Pulmonary: Negative     A/P  1. Intrauterine pregnancy at 29w2d   2. Pregnancy Risk:  COMPLICATED    Diagnoses and all orders for this visit:    1. 29 weeks gestation of pregnancy (Primary)  -     POC Urinalysis Dipstick, Multipro    2. Decreased fetal movements in third trimester, single or unspecified fetus        -----------------------  PLAN:   Return in about 1 week (around 2023) for as scheduled.      VITALIY Hdz  2023 08:52 EDT

## 2023-09-07 ENCOUNTER — TELEPHONE (OUTPATIENT)
Dept: OBSTETRICS AND GYNECOLOGY | Age: 33
End: 2023-09-07
Payer: COMMERCIAL

## 2023-09-07 ENCOUNTER — ROUTINE PRENATAL (OUTPATIENT)
Dept: OBSTETRICS AND GYNECOLOGY | Age: 33
End: 2023-09-07
Payer: COMMERCIAL

## 2023-09-07 VITALS — SYSTOLIC BLOOD PRESSURE: 110 MMHG | BODY MASS INDEX: 26.31 KG/M2 | WEIGHT: 163 LBS | DIASTOLIC BLOOD PRESSURE: 62 MMHG

## 2023-09-07 DIAGNOSIS — O36.8130 DECREASED FETAL MOVEMENTS IN THIRD TRIMESTER, SINGLE OR UNSPECIFIED FETUS: Primary | ICD-10-CM

## 2023-09-07 DIAGNOSIS — Z3A.29 29 WEEKS GESTATION OF PREGNANCY: ICD-10-CM

## 2023-09-07 NOTE — TELEPHONE ENCOUNTER
I called the patient to follow-up since she came to the office yesterday with decreased fetal movement.  Her biophysical profile was reassuring with normal IOANA 15.  Patient states that she is concerned since she has not felt the baby move since 9:00 this morning.  I asked the patient to eat and come here to the office for NST.

## 2023-09-07 NOTE — PROGRESS NOTES
Chief Complaint   Patient presents with    Pregnancy Problem     29 week ob visit for NST       HPI: 33 y.o.  at 29w3d gestation  She is here today for additional monitoring given h/o DFM  She has had some good FM since yesterday but given her h/o an NST was done  She was on for approximately 25 minutes and had a good, reactive NST  Noted good FM during the NST as well  Disc FM and ways to monitor moving forward      Vitals:    23 1517   BP: 110/62   Weight: 73.9 kg (163 lb)       ROS:  GI:  Negative  : na  Pulmonary: Negative     A/P  1. Intrauterine pregnancy at 29w3d   2. Pregnancy Risk:  COMPLICATED    Diagnoses and all orders for this visit:    1. Decreased fetal movements in third trimester, single or unspecified fetus (Primary)    2. 29 weeks gestation of pregnancy        -----------------------  PLAN:   Return for as scheduled for next week.      VITALIY Hdz  2023 16:36 EDT

## 2023-09-12 ENCOUNTER — ROUTINE PRENATAL (OUTPATIENT)
Dept: OBSTETRICS AND GYNECOLOGY | Age: 33
End: 2023-09-12
Payer: COMMERCIAL

## 2023-09-12 VITALS — WEIGHT: 158 LBS | BODY MASS INDEX: 25.5 KG/M2 | DIASTOLIC BLOOD PRESSURE: 64 MMHG | SYSTOLIC BLOOD PRESSURE: 112 MMHG

## 2023-09-12 DIAGNOSIS — Z3A.30 30 WEEKS GESTATION OF PREGNANCY: Primary | ICD-10-CM

## 2023-09-12 DIAGNOSIS — O36.8130 DECREASED FETAL MOVEMENTS IN THIRD TRIMESTER, SINGLE OR UNSPECIFIED FETUS: ICD-10-CM

## 2023-09-12 PROBLEM — O99.019 MATERNAL ANEMIA IN PREGNANCY, ANTEPARTUM: Status: ACTIVE | Noted: 2023-09-12

## 2023-09-12 LAB
GLUCOSE UR STRIP-MCNC: NEGATIVE MG/DL
PROT UR STRIP-MCNC: NEGATIVE MG/DL

## 2023-09-12 RX ORDER — FERROUS SULFATE 325(65) MG
325 TABLET ORAL 2 TIMES DAILY
Qty: 60 TABLET | Refills: 6 | Status: SHIPPED | OUTPATIENT
Start: 2023-09-12

## 2023-09-12 NOTE — PROGRESS NOTES
Patient is seen for follow-up for decreased fetal movement.  Patient has been doing kick counts and does have improved fetal movement now.  No complaints.  Patient reports she is taking her iron twice daily.    1 hour glucose tolerance test was elevated 138.  3-hour GTT was normal.  Hemoglobin was low at 10.3.  Blood pressure is 112/64 no protein  Weight gain for pregnancy is normal.  Ultrasound shows an estimated fetal weight of 3 pounds 10 ounces at the 62nd percentile.  Abdominal circumference at the 47th percentile.  IOANA is normal at 11.  BPP is 8 out of 8 and baby is vertex.    Assessment-30 weeks  Decreased fetal movement-patient notes improvement and BPP is reassuring today.  Normal fetal weight at 3 pounds 10 ounces at the 62nd percentile.  Discussed kick counts in detail.  Patient is scheduled for another BPP in 2 weeks.  We will keep that for now but if that is reassuring we can stop BPP's after that.  Patient was counseled to go to the hospital if she does not achieve kick counts  Anemia with hemoglobin of 10.3-continue iron twice daily  Need to pick pediatrician.

## 2023-09-26 ENCOUNTER — ROUTINE PRENATAL (OUTPATIENT)
Dept: OBSTETRICS AND GYNECOLOGY | Age: 33
End: 2023-09-26
Payer: COMMERCIAL

## 2023-09-26 VITALS — DIASTOLIC BLOOD PRESSURE: 66 MMHG | WEIGHT: 161 LBS | SYSTOLIC BLOOD PRESSURE: 108 MMHG | BODY MASS INDEX: 25.99 KG/M2

## 2023-09-26 DIAGNOSIS — Z13.89 SCREENING FOR BLOOD OR PROTEIN IN URINE: Primary | ICD-10-CM

## 2023-09-26 DIAGNOSIS — Z3A.32 32 WEEKS GESTATION OF PREGNANCY: ICD-10-CM

## 2023-09-26 DIAGNOSIS — O99.019 MATERNAL ANEMIA IN PREGNANCY, ANTEPARTUM: ICD-10-CM

## 2023-09-26 LAB
GLUCOSE UR STRIP-MCNC: NEGATIVE MG/DL
PROT UR STRIP-MCNC: NEGATIVE MG/DL

## 2023-09-28 ENCOUNTER — TELEPHONE (OUTPATIENT)
Dept: OBSTETRICS AND GYNECOLOGY | Age: 33
End: 2023-09-28
Payer: COMMERCIAL

## 2023-09-28 NOTE — TELEPHONE ENCOUNTER
Patient is currently @ [facility] c/o recurrent headache,runny nose and fever.She is going to be tested for Covid and will call us back with the results.

## 2023-10-10 ENCOUNTER — ROUTINE PRENATAL (OUTPATIENT)
Dept: OBSTETRICS AND GYNECOLOGY | Age: 33
End: 2023-10-10
Payer: COMMERCIAL

## 2023-10-10 VITALS — SYSTOLIC BLOOD PRESSURE: 124 MMHG | DIASTOLIC BLOOD PRESSURE: 70 MMHG | WEIGHT: 164 LBS | BODY MASS INDEX: 26.47 KG/M2

## 2023-10-10 DIAGNOSIS — N93.9 ABNORMAL UTERINE BLEEDING (AUB): ICD-10-CM

## 2023-10-10 DIAGNOSIS — Z13.89 SCREENING FOR BLOOD OR PROTEIN IN URINE: Primary | ICD-10-CM

## 2023-10-10 DIAGNOSIS — O99.019 MATERNAL ANEMIA IN PREGNANCY, ANTEPARTUM: ICD-10-CM

## 2023-10-10 DIAGNOSIS — Z3A.32 32 WEEKS GESTATION OF PREGNANCY: ICD-10-CM

## 2023-10-10 LAB
GLUCOSE UR STRIP-MCNC: NEGATIVE MG/DL
PROT UR STRIP-MCNC: NEGATIVE MG/DL

## 2023-10-10 RX ORDER — LORATADINE 10 MG/1
10 TABLET ORAL DAILY PRN
COMMUNITY
Start: 2023-09-28 | End: 2023-10-10

## 2023-10-10 NOTE — PROGRESS NOTES
Notes good fetal movements.  She is taking her iron.  No contractions.  She went into the hospital for some viral symptoms but tested negative for flu and COVID.    Doppler tones are positive and fundal height is appropriate  Blood pressure 124/70    Assessment-34 weeks  Discussed flu vaccine in detail today.  Patient is agreeable to flu vaccine.  Hemoglobin 10.3-continue iron twice daily.  Recheck at next visit  Pediatrician was recorded  Recommend kick counts.

## 2023-10-24 ENCOUNTER — ROUTINE PRENATAL (OUTPATIENT)
Dept: OBSTETRICS AND GYNECOLOGY | Age: 33
End: 2023-10-24
Payer: COMMERCIAL

## 2023-10-24 VITALS — SYSTOLIC BLOOD PRESSURE: 108 MMHG | BODY MASS INDEX: 27.44 KG/M2 | DIASTOLIC BLOOD PRESSURE: 66 MMHG | WEIGHT: 170 LBS

## 2023-10-24 DIAGNOSIS — O99.019 MATERNAL ANEMIA IN PREGNANCY, ANTEPARTUM: ICD-10-CM

## 2023-10-24 DIAGNOSIS — Z36.85 ANTENATAL SCREENING FOR STREPTOCOCCUS B: Primary | ICD-10-CM

## 2023-10-24 DIAGNOSIS — Z3A.36 36 WEEKS GESTATION OF PREGNANCY: ICD-10-CM

## 2023-10-24 DIAGNOSIS — Z13.89 SCREENING FOR BLOOD OR PROTEIN IN URINE: ICD-10-CM

## 2023-10-24 LAB
ERYTHROCYTE [DISTWIDTH] IN BLOOD BY AUTOMATED COUNT: 15 % (ref 12.3–15.4)
GLUCOSE UR STRIP-MCNC: NEGATIVE MG/DL
HCT VFR BLD AUTO: 34.2 % (ref 34–46.6)
HGB BLD-MCNC: 11.5 G/DL (ref 12–15.9)
MCH RBC QN AUTO: 29.9 PG (ref 26.6–33)
MCHC RBC AUTO-ENTMCNC: 33.6 G/DL (ref 31.5–35.7)
MCV RBC AUTO: 88.8 FL (ref 79–97)
PLATELET # BLD AUTO: 257 10*3/MM3 (ref 140–450)
PROT UR STRIP-MCNC: NEGATIVE MG/DL
RBC # BLD AUTO: 3.85 10*6/MM3 (ref 3.77–5.28)
WBC # BLD AUTO: 7.78 10*3/MM3 (ref 3.4–10.8)

## 2023-10-24 NOTE — PROGRESS NOTES
Patient is feeling good fetal movements.  She does feel some pelvic pressure.  She did well with her flu vaccine.  She declined other vaccinations.    Doppler heart tones are positive and fundal height is appropriate  GBS cultures collected  Cervix is 1 cm 50% and -2 station.  Weight gain for pregnancy is high.    Assessment-36 weeks  Anemia-recheck CBC today.  Hemoglobin was 10.3.  Continue iron twice daily  GBS was done today  We discussed labor signs and symptoms in detail.  Follow-up weekly  Recommend kick counts.

## 2023-10-26 PROBLEM — O99.820 GBS (GROUP B STREPTOCOCCUS CARRIER), +RV CULTURE, CURRENTLY PREGNANT: Status: ACTIVE | Noted: 2023-10-26

## 2023-10-26 LAB — GP B STREP DNA SPEC QL NAA+PROBE: POSITIVE

## 2023-10-30 ENCOUNTER — ROUTINE PRENATAL (OUTPATIENT)
Dept: OBSTETRICS AND GYNECOLOGY | Age: 33
End: 2023-10-30
Payer: COMMERCIAL

## 2023-10-30 VITALS — DIASTOLIC BLOOD PRESSURE: 72 MMHG | SYSTOLIC BLOOD PRESSURE: 118 MMHG | WEIGHT: 171 LBS | BODY MASS INDEX: 27.6 KG/M2

## 2023-10-30 DIAGNOSIS — Z3A.37 37 WEEKS GESTATION OF PREGNANCY: ICD-10-CM

## 2023-10-30 DIAGNOSIS — O99.019 MATERNAL ANEMIA IN PREGNANCY, ANTEPARTUM: ICD-10-CM

## 2023-10-30 DIAGNOSIS — O99.820 GBS (GROUP B STREPTOCOCCUS CARRIER), +RV CULTURE, CURRENTLY PREGNANT: ICD-10-CM

## 2023-10-30 DIAGNOSIS — Z13.89 SCREENING FOR BLOOD OR PROTEIN IN URINE: Primary | ICD-10-CM

## 2023-10-30 LAB
GLUCOSE UR STRIP-MCNC: NEGATIVE MG/DL
PROT UR STRIP-MCNC: ABNORMAL MG/DL

## 2023-10-30 NOTE — PROGRESS NOTES
The patient is feeling good fetal movements.  She has noticed some swelling in her feet.  No headaches or visual changes.    Pitting edema bilaterally in lower extremities.  Blood pressure 118/72 trace protein  GBS is positive  Cervix is 3 cm 70% and -2  Hemoglobin is 11.5.    Assessment-37 weeks  Anemia-hemoglobin is improved at 11.5-continue iron  GBS positive  Cervical change-we discussed signs and symptoms of labor to watch for  Recommend kick counts and follow-up weekly.

## 2023-11-06 ENCOUNTER — ROUTINE PRENATAL (OUTPATIENT)
Dept: OBSTETRICS AND GYNECOLOGY | Age: 33
End: 2023-11-06
Payer: COMMERCIAL

## 2023-11-06 VITALS — BODY MASS INDEX: 28.41 KG/M2 | WEIGHT: 176 LBS | SYSTOLIC BLOOD PRESSURE: 120 MMHG | DIASTOLIC BLOOD PRESSURE: 82 MMHG

## 2023-11-06 DIAGNOSIS — Z13.89 SCREENING FOR BLOOD OR PROTEIN IN URINE: Primary | ICD-10-CM

## 2023-11-06 DIAGNOSIS — Z34.90 PREGNANCY, UNSPECIFIED GESTATIONAL AGE: ICD-10-CM

## 2023-11-06 DIAGNOSIS — O99.019 MATERNAL ANEMIA IN PREGNANCY, ANTEPARTUM: ICD-10-CM

## 2023-11-06 DIAGNOSIS — O99.820 GBS (GROUP B STREPTOCOCCUS CARRIER), +RV CULTURE, CURRENTLY PREGNANT: ICD-10-CM

## 2023-11-06 LAB
GLUCOSE UR STRIP-MCNC: NEGATIVE MG/DL
PROT UR STRIP-MCNC: NEGATIVE MG/DL

## 2023-11-06 RX ORDER — FERROUS SULFATE 325(65) MG
325 TABLET ORAL 2 TIMES DAILY
Qty: 60 TABLET | Refills: 0 | Status: SHIPPED | OUTPATIENT
Start: 2023-11-06 | End: 2023-11-09 | Stop reason: SDUPTHER

## 2023-11-06 NOTE — PROGRESS NOTES
Patient is feeling good fetal movements.  No complaints.    Cervix is 3 cm 70% -1 station and anterior.  Head is well applied  GBS is positive  Blood pressure 120/82 with no protein.  Patient does have bilateral edema pitting.    Assessment-38 weeks  Discussed induction of labor next week at 39 weeks.  Patient will consider.  Labor warnings given  GBS positive  Continue iron  Recommend kick counts  Discussed edema.  Normal blood pressure.  Recommend patient go to the hospital if she has headaches or visual changes.

## 2023-11-09 ENCOUNTER — TELEPHONE (OUTPATIENT)
Dept: OBSTETRICS AND GYNECOLOGY | Age: 33
End: 2023-11-09
Payer: COMMERCIAL

## 2023-11-09 RX ORDER — FERROUS SULFATE 325(65) MG
325 TABLET ORAL 2 TIMES DAILY
Qty: 60 TABLET | Refills: 2 | Status: SHIPPED | OUTPATIENT
Start: 2023-11-09 | End: 2023-11-13 | Stop reason: SDUPTHER

## 2023-11-09 NOTE — TELEPHONE ENCOUNTER
Patient went to [facility] to  her iron rx and was told it was unavailable. Patient needs to know what else she should take?

## 2023-11-13 ENCOUNTER — ROUTINE PRENATAL (OUTPATIENT)
Dept: OBSTETRICS AND GYNECOLOGY | Age: 33
End: 2023-11-13
Payer: COMMERCIAL

## 2023-11-13 VITALS — BODY MASS INDEX: 28.73 KG/M2 | WEIGHT: 178 LBS | SYSTOLIC BLOOD PRESSURE: 136 MMHG | DIASTOLIC BLOOD PRESSURE: 80 MMHG

## 2023-11-13 DIAGNOSIS — Z3A.39 39 WEEKS GESTATION OF PREGNANCY: ICD-10-CM

## 2023-11-13 DIAGNOSIS — Z13.89 SCREENING FOR BLOOD OR PROTEIN IN URINE: Primary | ICD-10-CM

## 2023-11-13 DIAGNOSIS — O99.820 GBS (GROUP B STREPTOCOCCUS CARRIER), +RV CULTURE, CURRENTLY PREGNANT: ICD-10-CM

## 2023-11-13 DIAGNOSIS — O99.019 MATERNAL ANEMIA IN PREGNANCY, ANTEPARTUM: ICD-10-CM

## 2023-11-13 LAB
GLUCOSE UR STRIP-MCNC: NEGATIVE MG/DL
PROT UR STRIP-MCNC: ABNORMAL MG/DL

## 2023-11-13 RX ORDER — FERROUS SULFATE 325(65) MG
325 TABLET ORAL 2 TIMES DAILY
Qty: 60 TABLET | Refills: 2 | Status: SHIPPED | OUTPATIENT
Start: 2023-11-13

## 2023-11-13 NOTE — PROGRESS NOTES
Patient is feeling some mild cramping but nothing painful.  Baby is moving well.    Cervix is 4 cm 70% -2 station  GBS is positive  Doppler heart tones are positive and fundal height is appropriate  Bilateral 2+ pitting edema  Blood pressure 136/80 with trace protein.    Assessment-39 weeks  Recommend induction of labor since the patient has edema and blood pressure is higher than previous.  We discussed signs and symptoms of preeclampsia.  Patient and her partner declined induction.  Recommend she come back later in the week to recheck her blood pressure.  GBS positive  Anemia-continue iron-patient states that she has not been taking it since the pharmacy would not refill.  I am unsure why they would not refill but I did send another refill through to the pharmacy.  Recommend kick counts.

## 2023-11-14 ENCOUNTER — TELEPHONE (OUTPATIENT)
Dept: OBSTETRICS AND GYNECOLOGY | Age: 33
End: 2023-11-14
Payer: COMMERCIAL

## 2023-11-14 ENCOUNTER — HOSPITAL ENCOUNTER (EMERGENCY)
Facility: HOSPITAL | Age: 33
Discharge: HOME | End: 2023-11-14
Attending: OBSTETRICS & GYNECOLOGY | Admitting: OBSTETRICS & GYNECOLOGY
Payer: COMMERCIAL

## 2023-11-14 VITALS
HEIGHT: 66 IN | BODY MASS INDEX: 28.61 KG/M2 | SYSTOLIC BLOOD PRESSURE: 129 MMHG | HEART RATE: 82 BPM | RESPIRATION RATE: 16 BRPM | DIASTOLIC BLOOD PRESSURE: 88 MMHG | TEMPERATURE: 97.7 F | WEIGHT: 178 LBS

## 2023-11-14 LAB
BACTERIA UR QL AUTO: ABNORMAL /HPF
BILIRUB UR QL STRIP: NEGATIVE
CLARITY UR: ABNORMAL
COLOR UR: YELLOW
GLUCOSE UR STRIP-MCNC: ABNORMAL MG/DL
HGB UR QL STRIP.AUTO: ABNORMAL
HYALINE CASTS UR QL AUTO: ABNORMAL /LPF
KETONES UR QL STRIP: NEGATIVE
LEUKOCYTE ESTERASE UR QL STRIP.AUTO: ABNORMAL
NITRITE UR QL STRIP: NEGATIVE
PH UR STRIP.AUTO: 6.5 [PH] (ref 5–8)
PROT UR QL STRIP: ABNORMAL
RBC # UR STRIP: ABNORMAL /HPF
REF LAB TEST METHOD: ABNORMAL
SP GR UR STRIP: 1.02 (ref 1–1.03)
SQUAMOUS #/AREA URNS HPF: ABNORMAL /HPF
UROBILINOGEN UR QL STRIP: ABNORMAL
WBC # UR STRIP: ABNORMAL /HPF

## 2023-11-14 PROCEDURE — 87086 URINE CULTURE/COLONY COUNT: CPT | Performed by: OBSTETRICS & GYNECOLOGY

## 2023-11-14 PROCEDURE — 81001 URINALYSIS AUTO W/SCOPE: CPT | Performed by: OBSTETRICS & GYNECOLOGY

## 2023-11-14 PROCEDURE — 99284 EMERGENCY DEPT VISIT MOD MDM: CPT | Performed by: OBSTETRICS & GYNECOLOGY

## 2023-11-14 PROCEDURE — 59025 FETAL NON-STRESS TEST: CPT

## 2023-11-14 NOTE — OBED NOTES
"[facility]  Alyx Davis  : 1990  MRN: [id number]  CSN: [id number]    OB ED Provider Note    Subjective   Chief Complaint   Patient presents with    Contractions     Pt to [facility] with c/o contractions that started yesterday and have gotten stronger and more painful.  Pt was in the office yesterday and was 4 cm.  Pt denies vaginal bleeding but had some spotting yesterday, pt denies loss of fluid , and recent intercourse.     Alyx Davis is a 33 y.o. year old  with an Estimated Date of Delivery: 23 currently at 39w1d presenting with CTX since yesterday, possibly 10 min apart. She denies ROM or VB. FM is present.     Prenatal care has been with Dr. Edwards.  It has been complicated by GBS carrier.    OB History    Para Term  AB Living   2 0 0 0 1 0   SAB IAB Ectopic Molar Multiple Live Births   1 0 0 0 0 0      # Outcome Date GA Lbr Fausto/2nd Weight Sex Delivery Anes PTL Lv   2 Current            1 SAB 22 4w0d            Past Medical History:   Diagnosis Date    Gastroesophageal reflux disease 2021    GERD (gastroesophageal reflux disease)      Past Surgical History:   Procedure Laterality Date    ENDOSCOPY N/A 2021    Procedure: ESOPHAGOGASTRODUODENOSCOPY with cold biopsies;  Surgeon: Jewel Burkett MD;  Location: [facility] ENDOSCOPY;  Service: Gastroenterology;  Laterality: N/A;  pre: nausea, vomitting and abdominal pain  post:normal     No current facility-administered medications for this encounter.    No Known Allergies  Social History    Tobacco Use      Smoking status: Never      Smokeless tobacco: Never    Review of Systems   Gastrointestinal:  Positive for abdominal pain.   All other systems reviewed and are negative.        Objective   Temp 97.7 °F (36.5 °C) (Oral)   Resp 16   Ht 167.6 cm (66\")   Wt 80.7 kg (178 lb)   LMP 07/10/2022 (Exact Date)   BMI 28.73 kg/m² /88  General: well developed; well nourished  no acute distress   Abdomen: soft, " non-tender; no masses  gravid    FHT's: reactive and category 1      Cervix: was checked (by RN): 3 cm / 80 % / -2 unchanged after 2 hours   Presentation: cephalic   Contractions: irregular   Chest: Unlabored respirations    CV:  RRR   Ext:   No C/C/E   Back: CVA tenderness is deferred bilateral        Prenatal Labs  Lab Results   Component Value Date    HGB 11.5 (L) 10/24/2023    RUBELLAABIGG 3.15 04/06/2023    HEPBSAG Negative 04/06/2023    ABORH O Positive 07/12/2022    ABSCRN Negative 04/06/2023    OEU2QYV8 Non Reactive 04/06/2023    HEPCVIRUSABY Non Reactive 04/06/2023     08/29/2023    GGTFASTING 70 09/05/2023    TCH5JAEN 127 09/05/2023    VRY8YGRC 118 09/05/2023    XGM4SXOT 100 09/05/2023    STREPGPB Positive (A) 10/24/2023    URINECX Final report 04/06/2023    CHLAMNAA Negative 04/06/2023    NGONORRHON Negative 04/06/2023       Current Labs Reviewed   UA:    Lab Results   Component Value Date    SQUAMEPIUA 7-12 (A) 11/14/2023    SPECGRAVUR 1.024 11/14/2023    KETONESU Negative 11/14/2023    BLOODU Moderate (2+) (A) 11/14/2023    LEUKOCYTESUR Small (1+) (A) 11/14/2023    NITRITEU Negative 11/14/2023    RBCUA 11-20 (A) 11/14/2023    WBCUA 21-50 (A) 11/14/2023    BACTERIA 3+ (A) 11/14/2023          Assessment   IUP at 39w1d  Irregular CTX- no cervical change after 2 hours     Plan   D/C home with labor precautions. Keep regularly scheduled prenatal appointments.  Return for worsening symptoms, acute changes.    Lucia Starr MD  11/14/2023  15:59 EST

## 2023-11-14 NOTE — TELEPHONE ENCOUNTER
39wk OB c/o contractions every 8-10 minutes. Pt c/o feeling weak and vaginal bleeding lightly yesterday but not today. Pt states that her pain is stopping her in her tracks and she cannot talk when the pain is happening

## 2023-11-14 NOTE — TELEPHONE ENCOUNTER
Tatum was out of office for Fit Testing, per Lakeisha Hodges Pt advised to go to L&D for evaluation

## 2023-11-15 ENCOUNTER — HOSPITAL ENCOUNTER (INPATIENT)
Facility: HOSPITAL | Age: 33
LOS: 2 days | Discharge: HOME | End: 2023-11-17
Attending: OBSTETRICS & GYNECOLOGY | Admitting: OBSTETRICS & GYNECOLOGY
Payer: COMMERCIAL

## 2023-11-15 ENCOUNTER — ANESTHESIA EVENT (OUTPATIENT)
Dept: LABOR AND DELIVERY | Facility: HOSPITAL | Age: 33
End: 2023-11-15
Payer: COMMERCIAL

## 2023-11-15 ENCOUNTER — ANESTHESIA (OUTPATIENT)
Dept: LABOR AND DELIVERY | Facility: HOSPITAL | Age: 33
End: 2023-11-15
Payer: COMMERCIAL

## 2023-11-15 PROBLEM — Z34.90 PREGNANT: Status: ACTIVE | Noted: 2023-11-15

## 2023-11-15 PROBLEM — O14.00 MILD PRE-ECLAMPSIA, ANTEPARTUM: Status: RESOLVED | Noted: 2023-11-15 | Resolved: 2023-11-15

## 2023-11-15 PROBLEM — O13.9 GESTATIONAL HYPERTENSION, ANTEPARTUM: Status: ACTIVE | Noted: 2023-11-15

## 2023-11-15 PROBLEM — O14.00 MILD PRE-ECLAMPSIA, ANTEPARTUM: Status: ACTIVE | Noted: 2023-11-15

## 2023-11-15 PROBLEM — O14.93 PRE-ECLAMPSIA IN THIRD TRIMESTER: Status: ACTIVE | Noted: 2023-11-15

## 2023-11-15 LAB
ABO GROUP BLD: NORMAL
ALBUMIN SERPL-MCNC: 3.5 G/DL (ref 3.5–5.2)
ALBUMIN/GLOB SERPL: 1.1 G/DL
ALP SERPL-CCNC: 178 U/L (ref 39–117)
ALT SERPL W P-5'-P-CCNC: 13 U/L (ref 1–33)
ANION GAP SERPL CALCULATED.3IONS-SCNC: 9.3 MMOL/L (ref 5–15)
AST SERPL-CCNC: 20 U/L (ref 1–32)
ATMOSPHERIC PRESS: 757.1 MMHG
BACTERIA SPEC AEROBE CULT: NO GROWTH
BASE EXCESS BLDCOA CALC-SCNC: -7 MMOL/L (ref -2–2)
BASOPHILS # BLD AUTO: 0.03 10*3/MM3 (ref 0–0.2)
BASOPHILS NFR BLD AUTO: 0.3 % (ref 0–1.5)
BDY SITE: ABNORMAL
BILIRUB SERPL-MCNC: 0.2 MG/DL (ref 0–1.2)
BLD GP AB SCN SERPL QL: NEGATIVE
BUN SERPL-MCNC: 9 MG/DL (ref 6–20)
BUN/CREAT SERPL: 17.3 (ref 7–25)
CALCIUM SPEC-SCNC: 9.4 MG/DL (ref 8.6–10.5)
CHLORIDE SERPL-SCNC: 104 MMOL/L (ref 98–107)
CO2 BLDA-SCNC: 27.6 MMOL/L (ref 23–27)
CO2 SERPL-SCNC: 20.7 MMOL/L (ref 22–29)
CREAT SERPL-MCNC: 0.52 MG/DL (ref 0.57–1)
CREAT UR-MCNC: 50.6 MG/DL
DEPRECATED RDW RBC AUTO: 51.4 FL (ref 37–54)
DEVICE COMMENT: ABNORMAL
EGFRCR SERPLBLD CKD-EPI 2021: 127.6 ML/MIN/1.73
EOSINOPHIL # BLD AUTO: 0.13 10*3/MM3 (ref 0–0.4)
EOSINOPHIL NFR BLD AUTO: 1.3 % (ref 0.3–6.2)
ERYTHROCYTE [DISTWIDTH] IN BLOOD BY AUTOMATED COUNT: 15.4 % (ref 12.3–15.4)
GLOBULIN UR ELPH-MCNC: 3.2 GM/DL
GLUCOSE SERPL-MCNC: 82 MG/DL (ref 65–99)
HCO3 BLDCOA-SCNC: 25.2 MMOL/L (ref 22–28)
HCT VFR BLD AUTO: 36.7 % (ref 34–46.6)
HGB BLD-MCNC: 12.1 G/DL (ref 12–15.9)
IMM GRANULOCYTES # BLD AUTO: 0.04 10*3/MM3 (ref 0–0.05)
IMM GRANULOCYTES NFR BLD AUTO: 0.4 % (ref 0–0.5)
LYMPHOCYTES # BLD AUTO: 3.38 10*3/MM3 (ref 0.7–3.1)
LYMPHOCYTES NFR BLD AUTO: 35 % (ref 19.6–45.3)
MCH RBC QN AUTO: 30.2 PG (ref 26.6–33)
MCHC RBC AUTO-ENTMCNC: 33 G/DL (ref 31.5–35.7)
MCV RBC AUTO: 91.5 FL (ref 79–97)
MODALITY: ABNORMAL
MONOCYTES # BLD AUTO: 0.87 10*3/MM3 (ref 0.1–0.9)
MONOCYTES NFR BLD AUTO: 9 % (ref 5–12)
NEUTROPHILS NFR BLD AUTO: 5.22 10*3/MM3 (ref 1.7–7)
NEUTROPHILS NFR BLD AUTO: 54 % (ref 42.7–76)
NRBC BLD AUTO-RTO: 0 /100 WBC (ref 0–0.2)
PCO2 BLDCOA: 77 MMHG (ref 43–63)
PH BLDCOA: 7.12 PH UNITS (ref 7.18–7.34)
PLATELET # BLD AUTO: 259 10*3/MM3 (ref 140–450)
PMV BLD AUTO: 10.4 FL (ref 6–12)
PO2 BLDCOA: <18.1 MMHG (ref 12–26)
POTASSIUM SERPL-SCNC: 3.9 MMOL/L (ref 3.5–5.2)
PROT ?TM UR-MCNC: 22.9 MG/DL
PROT SERPL-MCNC: 6.7 G/DL (ref 6–8.5)
PROT/CREAT UR: 452.6 MG/G CREA (ref 0–200)
RBC # BLD AUTO: 4.01 10*6/MM3 (ref 3.77–5.28)
RH BLD: POSITIVE
SAO2 % BLDCOA: 9.4 %
SODIUM SERPL-SCNC: 134 MMOL/L (ref 136–145)
T&S EXPIRATION DATE: NORMAL
WBC NRBC COR # BLD: 9.67 10*3/MM3 (ref 3.4–10.8)

## 2023-11-15 PROCEDURE — 85025 COMPLETE CBC W/AUTO DIFF WBC: CPT | Performed by: OBSTETRICS & GYNECOLOGY

## 2023-11-15 PROCEDURE — 80053 COMPREHEN METABOLIC PANEL: CPT | Performed by: OBSTETRICS & GYNECOLOGY

## 2023-11-15 PROCEDURE — 84156 ASSAY OF PROTEIN URINE: CPT | Performed by: OBSTETRICS & GYNECOLOGY

## 2023-11-15 PROCEDURE — 82570 ASSAY OF URINE CREATININE: CPT | Performed by: OBSTETRICS & GYNECOLOGY

## 2023-11-15 PROCEDURE — 86901 BLOOD TYPING SEROLOGIC RH(D): CPT | Performed by: OBSTETRICS & GYNECOLOGY

## 2023-11-15 PROCEDURE — 25810000003 LACTATED RINGERS PER 1000 ML: Performed by: OBSTETRICS & GYNECOLOGY

## 2023-11-15 PROCEDURE — 86850 RBC ANTIBODY SCREEN: CPT | Performed by: OBSTETRICS & GYNECOLOGY

## 2023-11-15 PROCEDURE — 25010000002 PENICILLIN G POTASSIUM PER 600000 UNITS: Performed by: OBSTETRICS & GYNECOLOGY

## 2023-11-15 PROCEDURE — 25010000002 ROPIVACAINE PER 1 MG: Performed by: STUDENT IN AN ORGANIZED HEALTH CARE EDUCATION/TRAINING PROGRAM

## 2023-11-15 PROCEDURE — 99202 OFFICE O/P NEW SF 15 MIN: CPT | Performed by: OBSTETRICS & GYNECOLOGY

## 2023-11-15 PROCEDURE — 88307 TISSUE EXAM BY PATHOLOGIST: CPT

## 2023-11-15 PROCEDURE — 82803 BLOOD GASES ANY COMBINATION: CPT

## 2023-11-15 PROCEDURE — 86900 BLOOD TYPING SEROLOGIC ABO: CPT | Performed by: OBSTETRICS & GYNECOLOGY

## 2023-11-15 PROCEDURE — 0KQM0ZZ REPAIR PERINEUM MUSCLE, OPEN APPROACH: ICD-10-PCS | Performed by: OBSTETRICS & GYNECOLOGY

## 2023-11-15 PROCEDURE — C1755 CATHETER, INTRASPINAL: HCPCS | Performed by: ANESTHESIOLOGY

## 2023-11-15 RX ORDER — SODIUM CHLORIDE 0.9 % (FLUSH) 0.9 %
1-10 SYRINGE (ML) INJECTION AS NEEDED
Status: DISCONTINUED | OUTPATIENT
Start: 2023-11-15 | End: 2023-11-17 | Stop reason: HOSPADM

## 2023-11-15 RX ORDER — CARBOPROST TROMETHAMINE 250 UG/ML
250 INJECTION, SOLUTION INTRAMUSCULAR
Status: DISCONTINUED | OUTPATIENT
Start: 2023-11-15 | End: 2023-11-15 | Stop reason: HOSPADM

## 2023-11-15 RX ORDER — HYDROCODONE BITARTRATE AND ACETAMINOPHEN 10; 325 MG/1; MG/1
1 TABLET ORAL EVERY 4 HOURS PRN
Status: DISCONTINUED | OUTPATIENT
Start: 2023-11-15 | End: 2023-11-17 | Stop reason: HOSPADM

## 2023-11-15 RX ORDER — IBUPROFEN 600 MG/1
600 TABLET ORAL EVERY 6 HOURS PRN
Status: DISCONTINUED | OUTPATIENT
Start: 2023-11-15 | End: 2023-11-17 | Stop reason: HOSPADM

## 2023-11-15 RX ORDER — OXYTOCIN/0.9 % SODIUM CHLORIDE 30/500 ML
250 PLASTIC BAG, INJECTION (ML) INTRAVENOUS CONTINUOUS
Status: ACTIVE | OUTPATIENT
Start: 2023-11-15 | End: 2023-11-15

## 2023-11-15 RX ORDER — HYDROCODONE BITARTRATE AND ACETAMINOPHEN 5; 325 MG/1; MG/1
1 TABLET ORAL EVERY 4 HOURS PRN
Status: DISCONTINUED | OUTPATIENT
Start: 2023-11-15 | End: 2023-11-17 | Stop reason: HOSPADM

## 2023-11-15 RX ORDER — ONDANSETRON 2 MG/ML
4 INJECTION INTRAMUSCULAR; INTRAVENOUS EVERY 6 HOURS PRN
Status: DISCONTINUED | OUTPATIENT
Start: 2023-11-15 | End: 2023-11-15 | Stop reason: HOSPADM

## 2023-11-15 RX ORDER — METHYLERGONOVINE MALEATE 0.2 MG/ML
200 INJECTION INTRAVENOUS ONCE AS NEEDED
Status: DISCONTINUED | OUTPATIENT
Start: 2023-11-15 | End: 2023-11-15 | Stop reason: HOSPADM

## 2023-11-15 RX ORDER — DOCUSATE SODIUM 100 MG/1
100 CAPSULE, LIQUID FILLED ORAL 2 TIMES DAILY
Status: DISCONTINUED | OUTPATIENT
Start: 2023-11-15 | End: 2023-11-17 | Stop reason: HOSPADM

## 2023-11-15 RX ORDER — MISOPROSTOL 200 UG/1
800 TABLET ORAL ONCE AS NEEDED
Status: DISCONTINUED | OUTPATIENT
Start: 2023-11-15 | End: 2023-11-15 | Stop reason: HOSPADM

## 2023-11-15 RX ORDER — OXYTOCIN/0.9 % SODIUM CHLORIDE 30/500 ML
125 PLASTIC BAG, INJECTION (ML) INTRAVENOUS CONTINUOUS PRN
Status: DISCONTINUED | OUTPATIENT
Start: 2023-11-15 | End: 2023-11-17 | Stop reason: HOSPADM

## 2023-11-15 RX ORDER — MAGNESIUM CARB/ALUMINUM HYDROX 105-160MG
30 TABLET,CHEWABLE ORAL ONCE AS NEEDED
Status: DISCONTINUED | OUTPATIENT
Start: 2023-11-15 | End: 2023-11-15 | Stop reason: HOSPADM

## 2023-11-15 RX ORDER — FAMOTIDINE 20 MG/1
20 TABLET, FILM COATED ORAL 2 TIMES DAILY PRN
Status: DISCONTINUED | OUTPATIENT
Start: 2023-11-15 | End: 2023-11-15 | Stop reason: HOSPADM

## 2023-11-15 RX ORDER — PRENATAL VIT/IRON FUM/FOLIC AC 27MG-0.8MG
1 TABLET ORAL DAILY
Status: DISCONTINUED | OUTPATIENT
Start: 2023-11-15 | End: 2023-11-17 | Stop reason: HOSPADM

## 2023-11-15 RX ORDER — OXYTOCIN/0.9 % SODIUM CHLORIDE 30/500 ML
2 PLASTIC BAG, INJECTION (ML) INTRAVENOUS
Status: DISCONTINUED | OUTPATIENT
Start: 2023-11-15 | End: 2023-11-15

## 2023-11-15 RX ORDER — LIDOCAINE HYDROCHLORIDE AND EPINEPHRINE 15; 5 MG/ML; UG/ML
INJECTION, SOLUTION EPIDURAL AS NEEDED
Status: DISCONTINUED | OUTPATIENT
Start: 2023-11-15 | End: 2023-11-15 | Stop reason: SURG

## 2023-11-15 RX ORDER — OXYTOCIN/0.9 % SODIUM CHLORIDE 30/500 ML
125 PLASTIC BAG, INJECTION (ML) INTRAVENOUS CONTINUOUS PRN
Status: COMPLETED | OUTPATIENT
Start: 2023-11-15 | End: 2023-11-15

## 2023-11-15 RX ORDER — SODIUM CHLORIDE, SODIUM LACTATE, POTASSIUM CHLORIDE, CALCIUM CHLORIDE 600; 310; 30; 20 MG/100ML; MG/100ML; MG/100ML; MG/100ML
125 INJECTION, SOLUTION INTRAVENOUS CONTINUOUS
Status: DISCONTINUED | OUTPATIENT
Start: 2023-11-15 | End: 2023-11-15

## 2023-11-15 RX ORDER — FAMOTIDINE 10 MG/ML
20 INJECTION, SOLUTION INTRAVENOUS ONCE AS NEEDED
Status: DISCONTINUED | OUTPATIENT
Start: 2023-11-15 | End: 2023-11-15 | Stop reason: HOSPADM

## 2023-11-15 RX ORDER — PENICILLIN G 3000000 [IU]/50ML
3 INJECTION, SOLUTION INTRAVENOUS EVERY 4 HOURS
Status: DISCONTINUED | OUTPATIENT
Start: 2023-11-15 | End: 2023-11-15 | Stop reason: HOSPADM

## 2023-11-15 RX ORDER — LABETALOL 100 MG/1
100 TABLET, FILM COATED ORAL EVERY 12 HOURS SCHEDULED
Status: DISCONTINUED | OUTPATIENT
Start: 2023-11-15 | End: 2023-11-15

## 2023-11-15 RX ORDER — FENTANYL CIT 0.2 MG/100ML-ROPIV 0.2%-NACL 0.9% EPIDURAL INJ 2/0.2 MCG/ML-%
10 SOLUTION INJECTION CONTINUOUS
Status: DISCONTINUED | OUTPATIENT
Start: 2023-11-15 | End: 2023-11-15

## 2023-11-15 RX ORDER — FAMOTIDINE 10 MG/ML
20 INJECTION, SOLUTION INTRAVENOUS 2 TIMES DAILY PRN
Status: DISCONTINUED | OUTPATIENT
Start: 2023-11-15 | End: 2023-11-15 | Stop reason: HOSPADM

## 2023-11-15 RX ORDER — LABETALOL 100 MG/1
100 TABLET, FILM COATED ORAL EVERY 12 HOURS SCHEDULED
Status: DISCONTINUED | OUTPATIENT
Start: 2023-11-15 | End: 2023-11-17 | Stop reason: HOSPADM

## 2023-11-15 RX ORDER — ONDANSETRON 4 MG/1
4 TABLET, FILM COATED ORAL EVERY 6 HOURS PRN
Status: DISCONTINUED | OUTPATIENT
Start: 2023-11-15 | End: 2023-11-15 | Stop reason: HOSPADM

## 2023-11-15 RX ORDER — EPHEDRINE SULFATE 50 MG/ML
5 INJECTION, SOLUTION INTRAVENOUS
Status: DISCONTINUED | OUTPATIENT
Start: 2023-11-15 | End: 2023-11-15 | Stop reason: HOSPADM

## 2023-11-15 RX ORDER — HYDROCORTISONE 25 MG/G
1 CREAM TOPICAL AS NEEDED
Status: DISCONTINUED | OUTPATIENT
Start: 2023-11-15 | End: 2023-11-17 | Stop reason: HOSPADM

## 2023-11-15 RX ORDER — OXYTOCIN/0.9 % SODIUM CHLORIDE 30/500 ML
999 PLASTIC BAG, INJECTION (ML) INTRAVENOUS ONCE
Status: COMPLETED | OUTPATIENT
Start: 2023-11-15 | End: 2023-11-15

## 2023-11-15 RX ORDER — METHYLERGONOVINE MALEATE 0.2 MG/ML
200 INJECTION INTRAVENOUS ONCE AS NEEDED
Status: DISCONTINUED | OUTPATIENT
Start: 2023-11-15 | End: 2023-11-17 | Stop reason: HOSPADM

## 2023-11-15 RX ORDER — TERBUTALINE SULFATE 1 MG/ML
0.25 INJECTION, SOLUTION SUBCUTANEOUS AS NEEDED
Status: DISCONTINUED | OUTPATIENT
Start: 2023-11-15 | End: 2023-11-15 | Stop reason: HOSPADM

## 2023-11-15 RX ORDER — ROPIVACAINE HYDROCHLORIDE 2 MG/ML
INJECTION, SOLUTION EPIDURAL; INFILTRATION; PERINEURAL AS NEEDED
Status: DISCONTINUED | OUTPATIENT
Start: 2023-11-15 | End: 2023-11-15 | Stop reason: SURG

## 2023-11-15 RX ORDER — ONDANSETRON 2 MG/ML
4 INJECTION INTRAMUSCULAR; INTRAVENOUS ONCE AS NEEDED
Status: DISCONTINUED | OUTPATIENT
Start: 2023-11-15 | End: 2023-11-15 | Stop reason: HOSPADM

## 2023-11-15 RX ORDER — LIDOCAINE HYDROCHLORIDE 10 MG/ML
0.5 INJECTION, SOLUTION INFILTRATION; PERINEURAL ONCE AS NEEDED
Status: DISCONTINUED | OUTPATIENT
Start: 2023-11-15 | End: 2023-11-15 | Stop reason: HOSPADM

## 2023-11-15 RX ORDER — SODIUM CHLORIDE 9 MG/ML
40 INJECTION, SOLUTION INTRAVENOUS AS NEEDED
Status: DISCONTINUED | OUTPATIENT
Start: 2023-11-15 | End: 2023-11-15 | Stop reason: HOSPADM

## 2023-11-15 RX ORDER — SODIUM CHLORIDE 0.9 % (FLUSH) 0.9 %
10 SYRINGE (ML) INJECTION EVERY 12 HOURS SCHEDULED
Status: DISCONTINUED | OUTPATIENT
Start: 2023-11-15 | End: 2023-11-15 | Stop reason: HOSPADM

## 2023-11-15 RX ORDER — ACETAMINOPHEN 325 MG/1
650 TABLET ORAL EVERY 4 HOURS PRN
Status: DISCONTINUED | OUTPATIENT
Start: 2023-11-15 | End: 2023-11-15 | Stop reason: HOSPADM

## 2023-11-15 RX ORDER — ACETAMINOPHEN 325 MG/1
650 TABLET ORAL EVERY 6 HOURS PRN
Status: DISCONTINUED | OUTPATIENT
Start: 2023-11-15 | End: 2023-11-17 | Stop reason: HOSPADM

## 2023-11-15 RX ORDER — TRANEXAMIC ACID 10 MG/ML
1000 INJECTION, SOLUTION INTRAVENOUS ONCE AS NEEDED
Status: DISCONTINUED | OUTPATIENT
Start: 2023-11-15 | End: 2023-11-15

## 2023-11-15 RX ORDER — MISOPROSTOL 200 UG/1
600 TABLET ORAL ONCE AS NEEDED
Status: DISCONTINUED | OUTPATIENT
Start: 2023-11-15 | End: 2023-11-17 | Stop reason: HOSPADM

## 2023-11-15 RX ORDER — SODIUM CHLORIDE 0.9 % (FLUSH) 0.9 %
10 SYRINGE (ML) INJECTION AS NEEDED
Status: DISCONTINUED | OUTPATIENT
Start: 2023-11-15 | End: 2023-11-15 | Stop reason: HOSPADM

## 2023-11-15 RX ORDER — DIPHENHYDRAMINE HYDROCHLORIDE 50 MG/ML
12.5 INJECTION INTRAMUSCULAR; INTRAVENOUS EVERY 8 HOURS PRN
Status: DISCONTINUED | OUTPATIENT
Start: 2023-11-15 | End: 2023-11-15 | Stop reason: HOSPADM

## 2023-11-15 RX ORDER — BISACODYL 10 MG
10 SUPPOSITORY, RECTAL RECTAL DAILY PRN
Status: DISCONTINUED | OUTPATIENT
Start: 2023-11-16 | End: 2023-11-17 | Stop reason: HOSPADM

## 2023-11-15 RX ADMIN — ROPIVACAINE HYDROCHLORIDE 5 ML: 2 INJECTION, SOLUTION EPIDURAL; INFILTRATION at 04:49

## 2023-11-15 RX ADMIN — SODIUM CHLORIDE, POTASSIUM CHLORIDE, SODIUM LACTATE AND CALCIUM CHLORIDE 125 ML/HR: 600; 310; 30; 20 INJECTION, SOLUTION INTRAVENOUS at 04:47

## 2023-11-15 RX ADMIN — SODIUM CHLORIDE, POTASSIUM CHLORIDE, SODIUM LACTATE AND CALCIUM CHLORIDE 125 ML/HR: 600; 310; 30; 20 INJECTION, SOLUTION INTRAVENOUS at 03:25

## 2023-11-15 RX ADMIN — ROPIVACAINE HYDROCHLORIDE 5 ML: 2 INJECTION, SOLUTION EPIDURAL; INFILTRATION at 04:53

## 2023-11-15 RX ADMIN — DOCUSATE SODIUM 100 MG: 100 CAPSULE, LIQUID FILLED ORAL at 22:41

## 2023-11-15 RX ADMIN — IBUPROFEN 600 MG: 600 TABLET, FILM COATED ORAL at 16:56

## 2023-11-15 RX ADMIN — LIDOCAINE HYDROCHLORIDE AND EPINEPHRINE 3 ML: 15; 5 INJECTION, SOLUTION EPIDURAL at 04:47

## 2023-11-15 RX ADMIN — Medication 10 ML/HR: at 04:53

## 2023-11-15 RX ADMIN — LABETALOL HYDROCHLORIDE 100 MG: 100 TABLET, FILM COATED ORAL at 15:03

## 2023-11-15 RX ADMIN — HYDROCODONE BITARTRATE AND ACETAMINOPHEN 1 TABLET: 5; 325 TABLET ORAL at 13:57

## 2023-11-15 RX ADMIN — ACETAMINOPHEN 650 MG: 325 TABLET, FILM COATED ORAL at 18:41

## 2023-11-15 RX ADMIN — Medication 125 ML/HR: at 12:09

## 2023-11-15 RX ADMIN — Medication 999 ML/HR: at 11:46

## 2023-11-15 RX ADMIN — PENICILLIN G POTASSIUM 5 MILLION UNITS: 5000000 INJECTION, POWDER, FOR SOLUTION INTRAMUSCULAR; INTRAVENOUS at 03:40

## 2023-11-15 RX ADMIN — PENICILLIN G 3 MILLION UNITS: 3000000 INJECTION, SOLUTION INTRAVENOUS at 08:06

## 2023-11-15 NOTE — L&D DELIVERY NOTE
[facility]   Vaginal Delivery Note    Patient Name: Alyx Davis  : 1990  MRN: [id number]    Date of Delivery: 11/15/2023     Diagnosis     Pre & Post-Delivery:  Intrauterine pregnancy at 39w2d  Labor status:      Pregnant    Gastroesophageal reflux disease without esophagitis    Maternal anemia in pregnancy, antepartum    GBS (group B Streptococcus carrier), +RV culture, currently pregnant    Vaginal delivery             Problem List    Transfer to Postpartum     Review the Delivery Report for details.     Delivery     Delivery: Vaginal, Spontaneous     YOB: 2021    Time of Birth:  Gestational Age 10:43 AM   39w2d     Anesthesia: Epidural     Delivering clinician: Lianna Wilder    Forceps?   No   Vacuum? No    Shoulder dystocia present: No        Delivery narrative:  Patient was admitted with labor and had epidural placed for pain control. Fetal heart tones were reassuring and she received Penicillin for GBS prophylaxis. She underwent amniotomy with meconium stained fluid at 0631. She progressed to second stage and pushed approximately 45 minutes to spontaneous vaginal delivery. The shoulders and body delivered easily. A loose nuchal cord was noted and reduced with delivery. The infant was placed on mother's chest for kangaroo care. The umbilical cord was clamped and cut after 30 seconds by the father of the baby. The placenta delivered spontaneously and appeared intact. Manual exploration of the uterus did not reveal retained tissue. The cervix and vagina appeared intact. A second degree laceration was repaired. The bladder was drained with sterile technique. All counts were correct following.       Infant     Findings: male  infant     Infant observations: Weight: 3297 g (7 lb 4.3 oz)   Length: 20.5  in  Observations/Comments:  scale #4      Apgars: 8  @ 1 minute /    9  @ 5 minutes   Infant Name: Kaden     Placenta & Cord         Placenta delivered  Spontaneous  at    "11/15/2023 10:46 AM     Cord: 3 vessels  present.   Nuchal Cord?  yes; Number of nuchal loops present:   1   Cord blood obtained: Yes    Cord gases obtained:  Yes    Cord gas results: Venous:  No results found for: \"PHCVEN\", \"BECVEN\"    Arterial:    pH, Cord Arterial   Date Value Ref Range Status   11/15/2023 7.12 (L) 7.18 - 7.34 pH Units Final     Comment:     Serial Number: [id number]:  [id number]     Base Exc, Cord Arterial   Date Value Ref Range Status   11/15/2023 -7.0 (L) -2.0 - 2.0 mmol/L Final        Repair     Episiotomy: None     No    Lacerations: Yes  Laceration Information  Laceration Repaired?   Perineal: 2nd  Yes    Periurethral:       Labial:       Sulcus:       Vaginal:       Cervical:         Suture used for repair: 3-0 Vicryl     Estimated Blood Loss:  300 cc     Quantitative Blood Loss: Quantitative Blood Loss (mL): 243 mL (11/15/23 1118)        Complications     none    Disposition     Mother to Mother Baby/Postpartum  in stable condition currently.  Baby to NBN  in stable condition currently.    Lianna Wilder MD  11/15/23  12:07 EST        "

## 2023-11-15 NOTE — PLAN OF CARE
Problem: Adult Inpatient Plan of Care  Goal: Plan of Care Review  Outcome: Ongoing, Progressing  Flowsheets (Taken 11/15/2023 1818)  Progress: improving  Plan of Care Reviewed With: patient  Outcome Evaluation: Transitioned to M/B. Blood pressure elevated x2, Dr. Wilder aware, protein/creatinine ordered and labetalol started. PRN norco given for breakthrough pain, effective per pt. Pt stable and needs met at this time.

## 2023-11-15 NOTE — PROGRESS NOTES
Mildly increased BP's noted after delivery and a few noted during labor. Pt without significant headache/vision changes. Protein/creatinine ratio increased suggestive of mild preeclampsia. Labetalol 100 mg twice daily ordered. AST/ALT and platelets normal with admission labs.

## 2023-11-15 NOTE — PLAN OF CARE
Goal Outcome Evaluation:  Plan of Care Reviewed With: patient        Progress: improving  Outcome Evaluation: Pt resting with working epidural in palce. Pt fetal tracing category 1 and irregular moderate contraction noted.  PCN being administered per orders. pt family at BS for support. Cervical exam 4/80-90/-2 with bulging membranes.

## 2023-11-15 NOTE — ANESTHESIA PROCEDURE NOTES
Labor Epidural      Patient reassessed immediately prior to procedure    Patient location during procedure: OB  Start Time: 11/15/2023 4:45 AM  Stop Time: 11/15/2023 4:46 AM  Performed By  Anesthesiologist: Paulino Oden MD  Preanesthetic Checklist  Completed: patient identified, IV checked, site marked, risks and benefits discussed, surgical consent, monitors and equipment checked, pre-op evaluation and timeout performed  Additional Notes  Labor epidural using Arrow kit, no heme/CSF aspirated, no paraesthesias.  Test dose with 3cc 1.5% lido with epi is negative.    Prep:  Pt Position:sitting  Sterile Tech:cap, gloves, mask and sterile barrier  Prep:chlorhexidine gluconate and isopropyl alcohol  Monitoring:blood pressure monitoring, continuous pulse oximetry and EKG  Epidural Block Procedure:  Approach:midline  Guidance:landmark technique and palpation technique  Location:L3-L4  Needle Type:Tuohy  Needle Gauge:17  Loss of Resistance Medium: air  Loss of Resistance: 5cm  Cath Depth at skin:10 cm  Paresthesia: none  Aspiration:negative  Test Dose:negative  Number of Attempts: 1  Post Assessment:  Dressing:occlusive dressing applied and secured with tape  Pt Tolerance:patient tolerated the procedure well with no apparent complications  Complications:no

## 2023-11-15 NOTE — PROGRESS NOTES
"[facility]  Obstetric Progress Note    Subjective     Patient:    The patient feels comfortable with epidural.      Objective     Vital Signs Range for the last 24 hours  Temp:  [97.7 °F (36.5 °C)-98.6 °F (37 °C)] 98.1 °F (36.7 °C)   Temp src: Oral   BP: (105-142)/() 121/78   Heart Rate:  [] 76   Resp:  [16-18] 18   SpO2:  [97 %-100 %] 99 %       Device (Oxygen Therapy): room air   Weight:  [80.7 kg (178 lb)] 80.7 kg (178 lb)       Flowsheet Rows      Flowsheet Row First Filed Value   Admission Height 167.6 cm (66\") Documented at 11/15/2023 0036   Admission Weight 80.7 kg (178 lb) Documented at 11/15/2023 0036            Intake/Output last 24 hours:      Intake/Output Summary (Last 24 hours) at 11/15/2023 0911  Last data filed at 11/15/2023 0640  Gross per 24 hour   Intake 1407 ml   Output 300 ml   Net 1107 ml       Intake/Output this shift:    No intake/output data recorded.    Physical Exam:  General: Patient is comfortable and in no acute distress                     Presentation:    Cervix: Exam by: Method: sterile exam per physician   Dilation: Cervical Dilation (cm): 5   Effacement: Cervical Effacement: 90%   Station:           Fetal Heart Rate Assessment   Method: Fetal HR Assessment Method: external   Beats/min: Fetal HR (beats/min): 135   Baseline: Fetal HR Baseline: normal range   Variability: Fetal HR Variability: moderate (amplitude range 6 to 25 bpm)   Accels: Fetal HR Accelerations: greater than/equal to 15 bpm, lasting at least 15 seconds   Decels: Fetal HR Decelerations: absent   Tracing Category:       Uterine Assessment   Method: Method: external tocotransducer   Frequency (min): Contraction Frequency (Minutes): 3-9   Ctx Count in 10 min:     Duration:     Intensity: Contraction Intensity: moderate by palpation   Intensity by IUPC:     Resting Tone: Uterine Resting Tone: soft by palpation   Resting Tone by IUPC:     [place] Units:         Assessment & Plan       Pregnant    " Gastroesophageal reflux disease without esophagitis    Maternal anemia in pregnancy, antepartum    GBS (group B Streptococcus carrier), +RV culture, currently pregnant        Assessment:  1.  Intrauterine pregnancy at 39w2d gestation with reactive fetal status.    2.  labor  with ROM  3.  Obstetrical history significant for is non-contributory.  4.  GBS status:   Strep Gp B RAYMUNDO   Date Value Ref Range Status   10/24/2023 Positive (A) Negative Final     Comment:     Centers for Disease Control and Prevention (CDC) and American Congress  of Obstetricians and Gynecologists (ACOG) guidelines for prevention of   group B streptococcal (GBS) disease specify co-collection of  a vaginal and rectal swab specimen to maximize sensitivity of GBS  detection. Per the CDC and ACOG, swabbing both the lower vagina and  rectum substantially increases the yield of detection compared with  sampling the vagina alone.  Penicillin G, ampicillin, or cefazolin are indicated for intrapartum  prophylaxis of  GBS colonization. Reflex susceptibility  testing should be performed prior to use of clindamycin only on GBS  isolates from penicillin-allergic women who are considered a high risk  for anaphylaxis. Treatment with vancomycin without additional testing  is warranted if resistance to clindamycin is noted.         Plan:  1. fetal and uterine monitoring  continuously and antibiotic for GBS  2. Plan of care has been reviewed with patient and spouse/sister  3.  Risks, benefits of treatment plan have been discussed.  4.  All questions have been answered.        Lianna Wilder MD  11/15/2023  09:11 EST

## 2023-11-15 NOTE — H&P
[facility]  Obstetric History and Physical    Chief Complaint   Patient presents with    Contractions     TIFFANIE- pt reports contractions every 7-8 min apart starting at  rating 10/10 with contraction. Pt was seen in [facility] today and d/c home at 1735. Pt rpt +FM; denies LOF or VB. Pt was 3/80/-2.        Subjective     Patient is a 33 y.o. female  currently at 39w2d, who presents with complaints of painful contractions.  Patient was seen in triage earlier today and was sent home.  Her cervix is now changed.  Patient is now rating her pain 10 out of 10 but is unsure about an epidural.  She reports positive fetal movement and no loss of fluid or vaginal bleeding.    Her prenatal care is complicated by anemia and GBS positive status.  Her previous obstetric/gynecological history is noted for is non-contributory.    The following portions of the patients history were reviewed and updated as appropriate: past medical history, past surgical history, and problem list .       Prenatal Information:  Prenatal Results       Initial Prenatal Labs       Test Value Reference Range Date Time    Hemoglobin  12.4 g/dL 11.1 - 15.9 23 0933    Hematocrit  36.7 % 34.0 - 46.6 23 0933    Platelets  272 x10E3/uL 150 - 450 23 0933    Rubella IgG  3.15 index Immune >0.99 23 0933    Hepatitis B SAg  Negative  Negative 23 0933    Hepatitis C Ab  Non Reactive  Non Reactive 23 0933    RPR  Non Reactive  Non Reactive 23 0933    T. Pallidum Ab         ABO  O   23 0933    Rh  Positive   23 0933    Antibody Screen  Negative  Negative 23 0933    HIV  Non Reactive  Non Reactive 23 0933    Urine Culture  Final report   23 0917    Gonorrhea  Negative  Negative 23 0940    Chlamydia  Negative  Negative 23 0940    TSH  1.530 uIU/mL 0.450 - 4.500 22 1151    HgB A1c         Varicella IgG        HgB Electrophoresis         Cystic fibrosis                   Fetal  testing        Test Value Reference Range Date Time    NIPT        MSAFP        AFP-4                  2nd and 3rd Trimester       Test Value Reference Range Date Time    Hemoglobin (repeated)  11.5 g/dL 12.0 - 15.9 10/24/23 1143       10.3 g/dL 12.0 - 15.9 08/29/23 1316    Hematocrit (repeated)  34.2 % 34.0 - 46.6 10/24/23 1143       30.7 % 34.0 - 46.6 08/29/23 1316    Platelets   257 10*3/mm3 140 - 450 10/24/23 1143       268 10*3/mm3 140 - 450 08/29/23 1316       272 x10E3/uL 150 - 450 04/06/23 0933    GCT  138 mg/dL 65 - 139 08/29/23 1316    Antibody Screen (repeated)  Negative  Negative 04/06/23 0933    GTT Fasting  70 mg/dL 65 - 94 09/05/23 0824    GTT 1 Hr  127 mg/dL 65 - 179 09/05/23 0824    GTT 2 Hr  118 mg/dL 65 - 154 09/05/23 0824    GTT 3 Hr  100 mg/dL 65 - 139 09/05/23 0824    Group B Strep  Positive  Negative 10/24/23 1228              Other testing        Test Value Reference Range Date Time    [place] IgG         CMV IgG                   Drug Screening       Test Value Reference Range Date Time    Amphetamine Screen        Barbiturate Screen        Benzodiazepine Screen        Methadone Screen        Phencyclidine Screen        Opiates Screen        THC Screen        Cocaine Screen        Propoxyphene Screen        Buprenorphine Screen        Methamphetamine Screen        Oxycodone Screen        Tricyclic Antidepressants Screen                  Legend    ^: Historical                          External Prenatal Results       Pregnancy Outside Results - Transcribed From Office Records - See Scanned Records For Details       Test Value Date Time    ABO  O  04/06/23 0933    Rh  Positive  04/06/23 0933    Antibody Screen  Negative  04/06/23 0933    Varicella IgG  662 index 07/14/22 1151    Rubella  3.15 index 04/06/23 0933    Hgb  11.5 g/dL 10/24/23 1143       10.3 g/dL 08/29/23 1316       12.4 g/dL 04/06/23 0933    Hct  34.2 % 10/24/23 1143       30.7 % 08/29/23 1316       36.7 % 04/06/23 0933    Glucose  Fasting GTT  70 mg/dL 23 0824    Glucose Tolerance Test 1 hour  127 mg/dL 23 0824    Glucose Tolerance Test 3 hour  100 mg/dL 23 0824    Gonorrhea (discrete)  Negative  23 0940    Chlamydia (discrete)  Negative  23 0940    RPR  Non Reactive  23 0933    VDRL       Syphilis Antibody       HBsAg  Negative  23 0933    Herpes Simplex Virus PCR       Herpes Simplex VIrus Culture       HIV  Non Reactive  23 0933    Hep C RNA Quant PCR       Hep C Antibody  Non Reactive  23 0933    AFP       Group B Strep  Positive  10/24/23 1228    GBS Susceptibility to Clindamycin       GBS Susceptibility to Erythromycin       Fetal Fibronectin       Genetic Testing, Maternal Blood                 Drug Screening       Test Value Date Time    Urine Drug Screen       Amphetamine Screen       Barbiturate Screen       Benzodiazepine Screen       Methadone Screen       Phencyclidine Screen       Opiates Screen       THC Screen       Cocaine Screen       Propoxyphene Screen       Buprenorphine Screen       Methamphetamine Screen       Oxycodone Screen       Tricyclic Antidepressants Screen                 Legend    ^: Historical                             Past OB History:     OB History    Para Term  AB Living   2 0 0 0 1 0   SAB IAB Ectopic Molar Multiple Live Births   1 0 0 0 0 0      # Outcome Date GA Lbr Fausto/2nd Weight Sex Delivery Anes PTL Lv   2 Current            1 SAB 22 4w0d              Past Medical History: Past Medical History:   Diagnosis Date    Gastroesophageal reflux disease 2021    GERD (gastroesophageal reflux disease)       Past Surgical History Past Surgical History:   Procedure Laterality Date    ENDOSCOPY N/A 2021    Procedure: ESOPHAGOGASTRODUODENOSCOPY with cold biopsies;  Surgeon: Jewel Burkett MD;  Location: [facility] ENDOSCOPY;  Service: Gastroenterology;  Laterality: N/A;  pre: nausea, vomitting and abdominal pain  post:normal       Family History: Family History   Problem Relation Age of Onset    No Known Problems Father     No Known Problems Mother     Cancer Neg Hx       Social History:  reports that she has never smoked. She has never used smokeless tobacco.   reports no history of alcohol use.   reports no history of drug use.        General ROS: Pertinent items are noted in HPI    Objective       Vital Signs Range for the last 24 hours  Temperature: Temp:  [97.7 °F (36.5 °C)-98.6 °F (37 °C)] 98.6 °F (37 °C)   Temp Source: Temp src: Oral   BP: BP: (119-142)/(76-98) 137/98   Pulse: Heart Rate:  [66-86] 82   Respirations: Resp:  [16-18] 18   SPO2: SpO2:  [98 %-100 %] 100 %   O2 Amount (l/min):     O2 Devices Device (Oxygen Therapy): room air   Weight: Weight:  [80.7 kg (178 lb)] 80.7 kg (178 lb)     Physical Examination: General appearance -patient is crying due to pain with contractions  Mental status - normal mood, behavior, speech, dress, motor activity, and thought processes  Eyes - sclera anicteric  Abdomen -gravid, size equal to dates    Presentation: Vertex   Cervix: Exam by: Method: sterile exam per RN   Dilation: Cervical Dilation (cm): 3-4   Effacement: Cervical Effacement: 80%   Station:         Fetal Heart Rate Assessment   Method: Fetal HR Assessment Method: external   Beats/min: Fetal HR (beats/min): 130   Baseline: Fetal HR Baseline: normal range   Variability: Fetal HR Variability: moderate (amplitude range 6 to 25 bpm)   Accels: Fetal HR Accelerations: greater than/equal to 15 bpm, lasting at least 15 seconds   Decels: Fetal HR Decelerations: absent   Tracing Category:       Uterine Assessment   Method: Method: external tocotransducer, per patient report, palpation   Frequency (min): Contraction Frequency (Minutes): x2   Ctx Count in 10 min:     Duration:     Intensity: Contraction Intensity: moderate by palpation   Intensity by IUPC:     Resting Tone: Uterine Resting Tone: soft by palpation   Resting Tone by IUPC:      [place] Units:         Assessment & Plan       Pregnant    Gastroesophageal reflux disease without esophagitis    Maternal anemia in pregnancy, antepartum    GBS (group B Streptococcus carrier), +RV culture, currently pregnant        Assessment:  1.  Intrauterine pregnancy at 39w2d gestation with reactive, reassuring fetal status.    2.  labor  without ROM  3.  Obstetrical history significant for is non-contributory.  4.  GBS status:   Strep Gp B RAYMUNDO   Date Value Ref Range Status   10/24/2023 Positive (A) Negative Final     Comment:     Centers for Disease Control and Prevention (CDC) and American Congress  of Obstetricians and Gynecologists (ACOG) guidelines for prevention of   group B streptococcal (GBS) disease specify co-collection of  a vaginal and rectal swab specimen to maximize sensitivity of GBS  detection. Per the CDC and ACOG, swabbing both the lower vagina and  rectum substantially increases the yield of detection compared with  sampling the vagina alone.  Penicillin G, ampicillin, or cefazolin are indicated for intrapartum  prophylaxis of  GBS colonization. Reflex susceptibility  testing should be performed prior to use of clindamycin only on GBS  isolates from penicillin-allergic women who are considered a high risk  for anaphylaxis. Treatment with vancomycin without additional testing  is warranted if resistance to clindamycin is noted.         Plan:  1. fetal and uterine monitoring  continuously and antibiotic for GBS  2. Plan of care has been reviewed with patient   3.  Risks, benefits of treatment plan have been discussed.  4.  All questions have been answered.        Darrick Edwards MD  11/15/2023  03:07 EST

## 2023-11-15 NOTE — ANESTHESIA PREPROCEDURE EVALUATION
Anesthesia Evaluation     Patient summary reviewed and Nursing notes reviewed                Airway   Mallampati: II  TM distance: >3 FB  Neck ROM: full  Dental - normal exam     Pulmonary - negative pulmonary ROS   Cardiovascular - negative cardio ROS        Neuro/Psych- negative ROS  GI/Hepatic/Renal/Endo    (+) GERD    Musculoskeletal     Abdominal    Substance History      OB/GYN    (+) Pregnant        Other                    Anesthesia Plan    ASA 2     epidural     (39w2d    I have reviewed the patient's history with the patient and the chart, including all pertinent laboratory results and imaging. Risks of neuraxial anesthesia were discussed with patient including but not limited to: inadequate block, bleeding, infection, persistent numbness or weakness, nerve damage, painful dysesthesia and spinal headache.  )    Anesthetic plan, risks, benefits, and alternatives have been provided, discussed and informed consent has been obtained with: patient.    CODE STATUS:    Level Of Support Discussed With: Patient  Code Status (Patient has no pulse and is not breathing): CPR (Attempt to Resuscitate)  Medical Interventions (Patient has pulse or is breathing): Full Support

## 2023-11-15 NOTE — PROGRESS NOTES
Patient is comfortable with her epidural.    Patient has received antibiotics for GBS positive status.  Cervix is 5 cm 90% and -1 station  Amniotomy reveals meconium stained fluid  Fetal heart rate tracing is category 1  Contractions are every 3 to 4 minutes    Assessment-active labor  Discussed meconium  Anticipate spontaneous vaginal delivery.

## 2023-11-15 NOTE — OBED NOTES
"[facility]  Alyx Davis  : 1990  MRN: [id number]  CSN: [id number]    OB ED Provider Note    Subjective   Chief Complaint   Patient presents with    Contractions     TIFFANIE- pt reports contractions every 7-8 min apart starting at 2000 rating 10/10 with contraction. Pt was seen in [facility] today and d/c home at 1735. Pt rpt +FM; denies LOF or VB. Pt was 3/80/-2.      Alyx Davis is a 33 y.o. year old  with an Estimated Date of Delivery: 23 currently at 39w2d presenting with ongoing CTX since discharge from the [facility] yesterday evening.  During that encounter, minimal uterine activity was noted. Her cervix was 3/80/-2 upon discharge.  She denies ROM or VB. FM is present.    Prenatal care has been with Dr. Edwards.  It has been complicated by GBS carrier.    OB History    Para Term  AB Living   2 0 0 0 1 0   SAB IAB Ectopic Molar Multiple Live Births   1 0 0 0 0 0      # Outcome Date GA Lbr Fausto/2nd Weight Sex Delivery Anes PTL Lv   2 Current            1 SAB 22 4w0d            Past Medical History:   Diagnosis Date    Gastroesophageal reflux disease 2021    GERD (gastroesophageal reflux disease)      Past Surgical History:   Procedure Laterality Date    ENDOSCOPY N/A 2021    Procedure: ESOPHAGOGASTRODUODENOSCOPY with cold biopsies;  Surgeon: Jewel Burkett MD;  Location: [facility] ENDOSCOPY;  Service: Gastroenterology;  Laterality: N/A;  pre: nausea, vomitting and abdominal pain  post:normal     No current facility-administered medications for this encounter.    No Known Allergies  Social History    Tobacco Use      Smoking status: Never      Smokeless tobacco: Never    Review of Systems   Gastrointestinal:  Positive for abdominal pain.   All other systems reviewed and are negative.        Objective   /81 (BP Location: Right arm, Patient Position: Sitting)   Pulse 86   Temp 98.6 °F (37 °C) (Oral)   Resp 18   Ht 167.6 cm (66\")   Wt 80.7 kg (178 lb)   LMP " 07/10/2022 (Exact Date)   SpO2 100%   BMI 28.73 kg/m²   General: well developed; well nourished  severely distressed   Abdomen: soft, 3+ symphyseal tenderness; no masses  gravid    FHT's: reactive and category 1      Cervix: was checked (by RN): 3.5 cm / 80 % / -2   Presentation: cephalic   Contractions: irregular   Chest: Unlabored respirations    CV:  RRR   Ext:   No C/C/E   Back: SI tenderness is present bilateral        Prenatal Labs  Lab Results   Component Value Date    HGB 11.5 (L) 10/24/2023    RUBELLAABIGG 3.15 04/06/2023    HEPBSAG Negative 04/06/2023    ABORH O Positive 07/12/2022    ABSCRN Negative 04/06/2023    EWI8FVO5 Non Reactive 04/06/2023    HEPCVIRUSABY Non Reactive 04/06/2023     08/29/2023    GGTFASTING 70 09/05/2023    MKY0DIBI 127 09/05/2023    QPL7WBYY 118 09/05/2023    NXQ7FLVN 100 09/05/2023    STREPGPB Positive (A) 10/24/2023    URINECX Final report 04/06/2023    CHLAMNAA Negative 04/06/2023    NGONORRHON Negative 04/06/2023       Current Labs Reviewed   UA:    Lab Results   Component Value Date    SQUAMEPIUA 7-12 (A) 11/14/2023    SPECGRAVUR 1.024 11/14/2023    KETONESU Negative 11/14/2023    BLOODU Moderate (2+) (A) 11/14/2023    LEUKOCYTESUR Small (1+) (A) 11/14/2023    NITRITEU Negative 11/14/2023    RBCUA 11-20 (A) 11/14/2023    WBCUA 21-50 (A) 11/14/2023    BACTERIA 3+ (A) 11/14/2023          Assessment   IUP at 39w2d  Early active labor- small amount of cervical change from exam yesterday evening with markedly increased discomfort     Plan   Admit for pain control, augmentation at term.  Will need GBS prophylaxis.      Lucia Starr MD  11/15/2023  01:10 EST

## 2023-11-16 LAB
BASOPHILS # BLD AUTO: 0.03 10*3/MM3 (ref 0–0.2)
BASOPHILS NFR BLD AUTO: 0.3 % (ref 0–1.5)
DEPRECATED RDW RBC AUTO: 49.7 FL (ref 37–54)
EOSINOPHIL # BLD AUTO: 0.12 10*3/MM3 (ref 0–0.4)
EOSINOPHIL NFR BLD AUTO: 1.1 % (ref 0.3–6.2)
ERYTHROCYTE [DISTWIDTH] IN BLOOD BY AUTOMATED COUNT: 14.9 % (ref 12.3–15.4)
HCT VFR BLD AUTO: 30.9 % (ref 34–46.6)
HGB BLD-MCNC: 10.4 G/DL (ref 12–15.9)
IMM GRANULOCYTES # BLD AUTO: 0.05 10*3/MM3 (ref 0–0.05)
IMM GRANULOCYTES NFR BLD AUTO: 0.4 % (ref 0–0.5)
LYMPHOCYTES # BLD AUTO: 2.84 10*3/MM3 (ref 0.7–3.1)
LYMPHOCYTES NFR BLD AUTO: 25 % (ref 19.6–45.3)
MCH RBC QN AUTO: 30.6 PG (ref 26.6–33)
MCHC RBC AUTO-ENTMCNC: 33.7 G/DL (ref 31.5–35.7)
MCV RBC AUTO: 90.9 FL (ref 79–97)
MONOCYTES # BLD AUTO: 0.9 10*3/MM3 (ref 0.1–0.9)
MONOCYTES NFR BLD AUTO: 7.9 % (ref 5–12)
NEUTROPHILS NFR BLD AUTO: 65.3 % (ref 42.7–76)
NEUTROPHILS NFR BLD AUTO: 7.41 10*3/MM3 (ref 1.7–7)
NRBC BLD AUTO-RTO: 0 /100 WBC (ref 0–0.2)
PLATELET # BLD AUTO: 204 10*3/MM3 (ref 140–450)
PMV BLD AUTO: 10.4 FL (ref 6–12)
RBC # BLD AUTO: 3.4 10*6/MM3 (ref 3.77–5.28)
WBC NRBC COR # BLD: 11.35 10*3/MM3 (ref 3.4–10.8)

## 2023-11-16 PROCEDURE — 85025 COMPLETE CBC W/AUTO DIFF WBC: CPT | Performed by: OBSTETRICS & GYNECOLOGY

## 2023-11-16 RX ADMIN — LABETALOL HYDROCHLORIDE 100 MG: 100 TABLET, FILM COATED ORAL at 02:56

## 2023-11-16 RX ADMIN — LABETALOL HYDROCHLORIDE 100 MG: 100 TABLET, FILM COATED ORAL at 20:39

## 2023-11-16 RX ADMIN — IBUPROFEN 600 MG: 600 TABLET, FILM COATED ORAL at 09:16

## 2023-11-16 RX ADMIN — DOCUSATE SODIUM 100 MG: 100 CAPSULE, LIQUID FILLED ORAL at 20:39

## 2023-11-16 RX ADMIN — DOCUSATE SODIUM 100 MG: 100 CAPSULE, LIQUID FILLED ORAL at 09:16

## 2023-11-16 RX ADMIN — LABETALOL HYDROCHLORIDE 100 MG: 100 TABLET, FILM COATED ORAL at 09:16

## 2023-11-16 NOTE — ANESTHESIA POSTPROCEDURE EVALUATION
Patient: Alyx Davis    Procedure Summary       Date: 11/15/23 Room / Location:     Anesthesia Start: 0446 Anesthesia Stop: 1043    Procedure: LABOR ANALGESIA Diagnosis:     Scheduled Providers:  Provider: Emil Worrell MD    Anesthesia Type: epidural ASA Status: 2            Anesthesia Type: epidural    Vitals  Vitals Value Taken Time   /79 11/15/23 1500   Temp 36.8 °C (98.3 °F) 11/15/23 1500   Pulse 80 11/15/23 1500   Resp 16 11/15/23 1500   SpO2 100 % 11/15/23 1050   Vitals shown include unfiled device data.        Post Anesthesia Care and Evaluation    Patient location during evaluation: bedside  Patient participation: complete - patient participated  Level of consciousness: awake  Pain management: adequate    Airway patency: patent  Anesthetic complications: No anesthetic complications  PONV Status: none  Cardiovascular status: acceptable  Respiratory status: acceptable  Hydration status: acceptable  Post Neuraxial Block status: Motor and sensory function returned to baseline

## 2023-11-16 NOTE — LACTATION NOTE
Pt has been BF and giving formula supplement. LC assisted pt with latching baby. Baby is BF well at this time. Encouraged to BF first, at least every 2-3 hours for 10-15 min on each breast, before giving formula supplement. Reviewed provided booklet on BF with pt. Educated on milk coming in, importance of deep latching and ways to achieve it, nipple care, supply and demand. Pt has personal pump at home. Encouraged to call LC as needed.    Lactation Consult Note    Evaluation Completed: 2023 08:22 EST  Patient Name: Alyx Davis  :  1990  MRN:  [id number]     REFERRAL  INFORMATION:                                         DELIVERY HISTORY:        Skin to skin initiation date/time: 11/15/2023  10:53 AM   Skin to skin end date/time:           MATERNAL ASSESSMENT:                               INFANT ASSESSMENT:  Information for the patient's :  Ryan MaritzaDirkdany [[id number]]   No past medical history on file.                                                                                                   MATERNAL INFANT FEEDING:                                                                       EQUIPMENT TYPE:                                 BREAST PUMPING:          LACTATION REFERRALS:

## 2023-11-16 NOTE — PLAN OF CARE
Problem: Adult Inpatient Plan of Care  Goal: Plan of Care Review  Outcome: Ongoing, Progressing  Flowsheets (Taken 11/15/2023 2050)  Plan of Care Reviewed With: patient  Goal: Patient-Specific Goal (Individualized)  Outcome: Ongoing, Progressing  Goal: Absence of Hospital-Acquired Illness or Injury  Outcome: Ongoing, Progressing  Intervention: Identify and Manage Fall Risk  Recent Flowsheet Documentation  Taken 11/15/2023 2050 by Leona Cervantes, RN  Safety Promotion/Fall Prevention: safety round/check completed  Taken 11/15/2023 1948 by Leona Cervantes RN  Safety Promotion/Fall Prevention: safety round/check completed  Goal: Optimal Comfort and Wellbeing  Outcome: Ongoing, Progressing  Intervention: Monitor Pain and Promote Comfort  Recent Flowsheet Documentation  Taken 11/15/2023 1948 by Leona Cervantes RN  Pain Management Interventions: no interventions per patient request  Goal: Readiness for Transition of Care  Outcome: Ongoing, Progressing     Problem: Bleeding (Labor)  Goal: Hemostasis  Outcome: Ongoing, Progressing     Problem: Change in Fetal Wellbeing (Labor)  Goal: Stable Fetal Wellbeing  Outcome: Ongoing, Progressing     Problem: Delayed Labor Progression (Labor)  Goal: Effective Progression to Delivery  Outcome: Ongoing, Progressing     Problem: Infection (Labor)  Goal: Absence of Infection Signs and Symptoms  Outcome: Ongoing, Progressing     Problem: Labor Pain (Labor)  Goal: Acceptable Pain Control  Outcome: Ongoing, Progressing     Problem: Uterine Tachysystole (Labor)  Goal: Normal Uterine Contraction Pattern  Outcome: Ongoing, Progressing     Problem: Device-Related Complication Risk (Anesthesia/Analgesia, Neuraxial)  Goal: Safe Infusion Delivery Completion  Outcome: Ongoing, Progressing     Problem: Infection (Anesthesia/Analgesia, Neuraxial)  Goal: Absence of Infection Signs and Symptoms  Outcome: Ongoing, Progressing     Problem: Nausea and Vomiting (Anesthesia/Analgesia, Neuraxial)  Goal: Nausea  and Vomiting Relief  Outcome: Ongoing, Progressing     Problem: Pain (Anesthesia/Analgesia, Neuraxial)  Goal: Effective Pain Control  Outcome: Ongoing, Progressing  Intervention: Prevent or Manage Pain  Recent Flowsheet Documentation  Taken 11/15/2023 1948 by Leona Cervantes RN  Pain Management Interventions: no interventions per patient request     Problem: Respiratory Compromise (Anesthesia/Analgesia, Neuraxial)  Goal: Effective Oxygenation and Ventilation  Outcome: Ongoing, Progressing     Problem: Sensorimotor Impairment (Anesthesia/Analgesia, Neuraxial)  Goal: Baseline Motor Function  Outcome: Ongoing, Progressing  Intervention: Optimize Sensorimotor Function  Recent Flowsheet Documentation  Taken 11/15/2023 2050 by Leona Cervantes RN  Safety Promotion/Fall Prevention: safety round/check completed  Taken 11/15/2023 1948 by Leona Cervantes RN  Safety Promotion/Fall Prevention: safety round/check completed     Problem: Urinary Retention (Anesthesia/Analgesia, Neuraxial)  Goal: Effective Urinary Elimination  Outcome: Ongoing, Progressing     Problem:  Fall Injury Risk  Goal: Absence of Fall, Infant Drop and Related Injury  Outcome: Ongoing, Progressing  Intervention: Promote Injury-Free Environment  Recent Flowsheet Documentation  Taken 11/15/2023 2050 by Leona Cervantes RN  Safety Promotion/Fall Prevention: safety round/check completed  Taken 11/15/2023 1948 by Leona Cervantes RN  Safety Promotion/Fall Prevention: safety round/check completed  Goal: Absence of Fall, Infant Drop and Related Injury  Outcome: Ongoing, Progressing  Intervention: Promote Injury-Free Environment  Recent Flowsheet Documentation  Taken 11/15/2023 2050 by Leona Cervantes RN  Safety Promotion/Fall Prevention: safety round/check completed  Taken 11/15/2023 1948 by Leona Cervantes RN  Safety Promotion/Fall Prevention: safety round/check completed     Problem: Skin Injury Risk Increased  Goal: Skin Health and Integrity  Outcome: Ongoing,  Progressing   Goal Outcome Evaluation:  Plan of Care Reviewed With: patient

## 2023-11-16 NOTE — PROGRESS NOTES
"[facility]  Vaginal Delivery Progress Note        Postpartum Day 1: Vaginal Delivery    The patient feels well.  Her pain is well controlled with nonsteroidal anti-inflammatory drugs.   She is ambulating well.  Patient describes her bleeding as thin lochia.    Breastfeeding: infant latching.     Problem list:  Patient Active Problem List   Diagnosis    Abnormal uterine bleeding (AUB)    Infertility management    HPV in female    Pregnancy    Gastroesophageal reflux disease without esophagitis    Maternal anemia in pregnancy, antepartum    GBS (group B Streptococcus carrier), +RV culture, currently pregnant    Pregnant    Vaginal delivery    Pre-eclampsia in third trimester                 Vitals:    11/15/23 2345 11/16/23 0255 11/16/23 0724 11/16/23 0916   BP: 126/86 134/87 119/79 109/72   BP Location: Right arm  Right arm    Patient Position: Sitting  Sitting    Pulse: 85 97 81 84   Resp: 16  16    Temp: 98.3 °F (36.8 °C)  98.6 °F (37 °C)    TempSrc: Oral  Oral    SpO2:       Weight:       Height:             Flowsheet Rows      Flowsheet Row First Filed Value   Admission Height 167.6 cm (66\") Documented at 11/15/2023 0036   Admission Weight 80.7 kg (178 lb) Documented at 11/15/2023 0036            Physical Exam:  General:  no acute distresss.  Abdomen: abdomen is soft without significant tenderness, masses, organomegaly or guarding. Fundus: appropriate, firm, non tender  Extremities: normal, atraumatic, no cyanosis, and trace edema.       Lab results reviewed:  Yes   Rubella:  Immune  Rh Status: Positive           1. Diamant Muhoza is Day 1  post-partum  Vaginal, Spontaneous   .     Meeting all postpartum milestones.     Infant circumcision today. Risks, benefits, alternatives discussed, see op note.     Plan:  Continue current care.      Jessica Bear MD  11/16/2023  14:02 EST    "

## 2023-11-16 NOTE — PLAN OF CARE
Problem: Adult Inpatient Plan of Care  Goal: Plan of Care Review  Outcome: Ongoing, Progressing  Flowsheets (Taken 11/16/2023 6629)  Progress: improving  Plan of Care Reviewed With:   patient   family  Outcome Evaluation: Post-partum day #1.VSS. Pain controlled with tylenol and motrin. Up at [place] and voiding spontaneously. Pt stable and needs met at this time

## 2023-11-17 VITALS
SYSTOLIC BLOOD PRESSURE: 139 MMHG | HEART RATE: 70 BPM | TEMPERATURE: 98.1 F | HEIGHT: 66 IN | BODY MASS INDEX: 28.61 KG/M2 | RESPIRATION RATE: 18 BRPM | OXYGEN SATURATION: 100 % | DIASTOLIC BLOOD PRESSURE: 89 MMHG | WEIGHT: 178 LBS

## 2023-11-17 RX ORDER — IBUPROFEN 600 MG/1
600 TABLET ORAL EVERY 6 HOURS PRN
Qty: 40 TABLET | Refills: 1 | Status: SHIPPED | OUTPATIENT
Start: 2023-11-17 | End: 2023-11-20 | Stop reason: SDUPTHER

## 2023-11-17 RX ORDER — LABETALOL 100 MG/1
100 TABLET, FILM COATED ORAL EVERY 12 HOURS SCHEDULED
Qty: 60 TABLET | Refills: 1 | Status: SHIPPED | OUTPATIENT
Start: 2023-11-17

## 2023-11-17 RX ADMIN — LABETALOL HYDROCHLORIDE 100 MG: 100 TABLET, FILM COATED ORAL at 09:32

## 2023-11-17 RX ADMIN — DOCUSATE SODIUM 100 MG: 100 CAPSULE, LIQUID FILLED ORAL at 09:32

## 2023-11-17 RX ADMIN — IBUPROFEN 600 MG: 600 TABLET, FILM COATED ORAL at 02:07

## 2023-11-17 RX ADMIN — Medication 1 TABLET: at 09:32

## 2023-11-17 RX ADMIN — Medication: at 00:05

## 2023-11-17 NOTE — PLAN OF CARE
Goal Outcome Evaluation:  Plan of Care Reviewed With: patient           Outcome Evaluation: VSS, voding spontaneously, ambulating independently, pain adequately controlled, bottle feeding

## 2023-11-17 NOTE — DISCHARGE SUMMARY
Vaginal delivery Discharge Summary      Date of Admission: 11/15/2023    Date of Discharge:  2023    Patient: Alyx Davis      MR#:[id number]    Surgeon/OB: Lianna Wilder     Discharge Diagnosis: Vaginal Delivery at 39w2d, uncomplicated recovery, preeclampsia    Procedures:  Vaginal, Spontaneous     11/15/2023    10:43 AM      Anesthesia:  Epidural     Presenting Problem/History of Present Illness  Pregnant [Z34.90]     Patient Active Problem List   Diagnosis    Abnormal uterine bleeding (AUB)    Infertility management    HPV in female    Pregnancy    Gastroesophageal reflux disease without esophagitis    Maternal anemia in pregnancy, antepartum    GBS (group B Streptococcus carrier), +RV culture, currently pregnant    Pregnant    Vaginal delivery    Pre-eclampsia in third trimester       Hospital Course  Patient is a 33 y.o. female  at 39w2d status post vaginal delivery.    Uneventful recovery.  Patient is ambulating, tolerating a regular diet.  Perineum is intact. Continue labatelol at home for BP Control    Infant:   male  fetus 3297 g (7 lb 4.3 oz)  with Apgar scores of 8 , 9  at five minutes.    Condition on Discharge:  Stable    Vital Signs  Temp:  [97.6 °F (36.4 °C)-98.5 °F (36.9 °C)] 98.1 °F (36.7 °C)  Heart Rate:  [70-83] 70  Resp:  [16-18] 18  BP: (105-139)/(65-89) 139/89    Lab Results   Component Value Date    WBC 11.35 (H) 2023    HGB 10.4 (L) 2023    HCT 30.9 (L) 2023    MCV 90.9 2023     2023       Discharge Disposition  Home or Self Care    Discharge Medications     Discharge Medications        New Medications        Instructions Start Date   ibuprofen 600 MG tablet  Commonly known as: ADVIL,MOTRIN   600 mg, Oral, Every 6 Hours PRN      labetalol 100 MG tablet  Commonly known as: NORMODYNE   100 mg, Oral, Every 12 Hours Scheduled             Continue These Medications        Instructions Start Date   ferrous sulfate 325 (65 FE) MG tablet    325 mg, Oral, 2 Times Daily      influenza vac split quad 0.5 ML suspension prefilled syringe injection  Commonly known as: FLUZONE,FLUARIX,AFLURIA,FLULAVAL   0.5 mL, Intramuscular, Once      prenatal vitamin 28-0.8 28-0.8 MG tablet tablet   1 tablet, Oral, Daily               Discharge Diet: Regular    Activity at Discharge:   Activity Instructions       Pelvic Rest      Nothing in the vagina for six weeks            Follow-up Appointments  No future appointments.  Additional Instructions for the Follow-ups that You Need to Schedule       Call MD With Problems / Concerns   As directed      Monitor your bleeding and if is too heavy then please call the office or come to the hospital.   If you have fever, abdominal tenderness, or otherwise feel unwell please call or come to the hospital.  Watch for signs and symptoms of postpartum preeclamspia which include: high blood pressure (systolic 140 or greater, diastolic 90 or greater), terrible headache not relieved with tylenol, upper abdominal pain, persistently seeing spots or flashing lights, persistent nausea and vomiting. If you have any of these please call the office or come to the hospital.    Order Comments: Monitor your bleeding and if is too heavy then please call the office or come to the hospital. If you have fever, abdominal tenderness, or otherwise feel unwell please call or come to the hospital. Watch for signs and symptoms of postpartum preeclamspia which include: high blood pressure (systolic 140 or greater, diastolic 90 or greater), terrible headache not relieved with tylenol, upper abdominal pain, persistently seeing spots or flashing lights, persistent nausea and vomiting. If you have any of these please call the office or come to the hospital.         Discharge Follow-up with Specified Provider: Follow up with your primary OB next week for BP check   As directed      To: Follow up with your primary OB next week for BP check                  Prenatal  labs/vax:     Kavita Aleman MD  11/17/23  11:32 EST

## 2023-11-17 NOTE — PROGRESS NOTES
[facility]   Obstetrics and Gynecology     2023    Name:Alyx Davis   MR#:[id number]    Vaginal Delivery Progress Note    HD#2    Subjective   Postpartum Day 2: 33 y.o. female  delivered at 39w2d  delivered a male  infant.     The patient feels well.  Her pain is controlled.    She is ambulating well.  Patient describes her bleeding as moderate lochia.    Breastfeeding/bottle:  The patient is currently breastfeeding.    Patient Active Problem List   Diagnosis    Abnormal uterine bleeding (AUB)    Infertility management    HPV in female    Pregnancy    Gastroesophageal reflux disease without esophagitis    Maternal anemia in pregnancy, antepartum    GBS (group B Streptococcus carrier), +RV culture, currently pregnant    Pregnant    Vaginal delivery    Pre-eclampsia in third trimester       Objective   Vital Signs Range for the last 24 hours  Temp: Temp:  [97.6 °F (36.4 °C)-98.5 °F (36.9 °C)] 98.1 °F (36.7 °C) Temp src: Oral   BP: BP: (105-139)/(65-89) 139/89        Pulse: Heart Rate:  [70-83] 70  RR: Resp:  [16-18] 18  Weight: 80.7 kg (178 lb)  BMI:  Body mass index is 28.73 kg/m².    Results from last 7 days   Lab Units 23  0733 11/15/23  0330   WBC 10*3/mm3 11.35* 9.67   HEMOGLOBIN g/dL 10.4* 12.1   HEMATOCRIT % 30.9* 36.7   PLATELETS 10*3/mm3 204 259     Results from last 7 days   Lab Units 11/15/23  0330   SODIUM mmol/L 134*   POTASSIUM mmol/L 3.9   CHLORIDE mmol/L 104   CO2 mmol/L 20.7*   BUN mg/dL 9   CREATININE mg/dL 0.52*   CALCIUM mg/dL 9.4   ALK PHOS U/L 178*   ALT (SGPT) U/L 13   AST (SGOT) U/L 20   GLUCOSE mg/dL 82       Physical Exam  Well, no distress  Regular, nonlabored breathing  Fundus firm, well below umbilicus  Trace pedal edema, no calf TTP    Assessment/Plan   1.  PPD# 2    Pregnant    Gastroesophageal reflux disease without esophagitis    Maternal anemia in pregnancy, antepartum    GBS (group B Streptococcus carrier), +RV culture, currently pregnant     Vaginal delivery    Pre-eclampsia in third trimester      Plan:   Preeclampsia PP on labetalol 100 mg BID, will continue as outpt and needs BP check next week    Kavita Aleman MD  11/17/2023 11:30 EST

## 2023-11-20 ENCOUNTER — TELEPHONE (OUTPATIENT)
Dept: OBSTETRICS AND GYNECOLOGY | Age: 33
End: 2023-11-20
Payer: COMMERCIAL

## 2023-11-20 RX ORDER — IBUPROFEN 600 MG/1
600 TABLET ORAL EVERY 6 HOURS PRN
Qty: 30 TABLET | Refills: 0 | Status: SHIPPED | OUTPATIENT
Start: 2023-11-20

## 2023-11-20 RX ORDER — DOCUSATE SODIUM 100 MG/1
100 CAPSULE, LIQUID FILLED ORAL 2 TIMES DAILY
Qty: 60 CAPSULE | Refills: 1 | Status: SHIPPED | OUTPATIENT
Start: 2023-11-20

## 2023-11-20 RX ORDER — ACETAMINOPHEN 325 MG/1
325 TABLET ORAL EVERY 6 HOURS PRN
Qty: 30 TABLET | Refills: 0 | Status: SHIPPED | OUTPATIENT
Start: 2023-11-20 | End: 2024-11-19

## 2023-11-20 NOTE — TELEPHONE ENCOUNTER
Recommend Motrin 600 mg and Tylenol 500 mg 1 of each every 6 hours.  If that does not control pain.  Patient should be seen.

## 2023-11-20 NOTE — TELEPHONE ENCOUNTER
Pt requested Tylenol, Motrin and Colace be sent to her [facility]'s pharmacy so her insurance would cover the cost. Done.

## 2023-12-29 ENCOUNTER — POSTPARTUM VISIT (OUTPATIENT)
Dept: OBSTETRICS AND GYNECOLOGY | Age: 33
End: 2023-12-29
Payer: COMMERCIAL

## 2023-12-29 VITALS
SYSTOLIC BLOOD PRESSURE: 114 MMHG | HEIGHT: 66 IN | BODY MASS INDEX: 25.33 KG/M2 | DIASTOLIC BLOOD PRESSURE: 68 MMHG | WEIGHT: 157.6 LBS

## 2023-12-29 DIAGNOSIS — Z78.9 BREASTFEEDING (INFANT): ICD-10-CM

## 2023-12-29 DIAGNOSIS — N89.8 VAGINAL DISCHARGE: ICD-10-CM

## 2023-12-29 PROBLEM — O99.820 GBS (GROUP B STREPTOCOCCUS CARRIER), +RV CULTURE, CURRENTLY PREGNANT: Status: RESOLVED | Noted: 2023-10-26 | Resolved: 2023-12-29

## 2023-12-29 PROBLEM — Z34.90 PREGNANT: Status: RESOLVED | Noted: 2023-11-15 | Resolved: 2023-12-29

## 2023-12-29 PROBLEM — O14.93 PRE-ECLAMPSIA IN THIRD TRIMESTER: Status: RESOLVED | Noted: 2023-11-15 | Resolved: 2023-12-29

## 2023-12-29 PROBLEM — B97.7 HPV IN FEMALE: Status: RESOLVED | Noted: 2022-08-01 | Resolved: 2023-12-29

## 2023-12-29 PROBLEM — N93.9 ABNORMAL UTERINE BLEEDING (AUB): Status: RESOLVED | Noted: 2022-07-26 | Resolved: 2023-12-29

## 2023-12-29 PROBLEM — O99.019 MATERNAL ANEMIA IN PREGNANCY, ANTEPARTUM: Status: RESOLVED | Noted: 2023-09-12 | Resolved: 2023-12-29

## 2023-12-29 PROBLEM — Z31.9 INFERTILITY MANAGEMENT: Status: RESOLVED | Noted: 2022-07-26 | Resolved: 2023-12-29

## 2023-12-29 PROBLEM — Z34.90 PREGNANCY: Status: RESOLVED | Noted: 2023-04-25 | Resolved: 2023-12-29

## 2023-12-29 PROBLEM — K21.9 GASTROESOPHAGEAL REFLUX DISEASE WITHOUT ESOPHAGITIS: Status: RESOLVED | Noted: 2023-06-23 | Resolved: 2023-12-29

## 2023-12-29 NOTE — PROGRESS NOTES
"Subjective     Chief Complaint   Patient presents with    Routine Prenatal Visit     6 wk pp check, vaginal delivery with second degree tear, baby boy \"Kaden\" 7lb 4.3oz, breastfeeding, pt unsure if she wants contraception       Baby's name:Kaden Davis is a 33 y.o. female who presents for a postpartum visit. She is 6 weeks postpartum following a spontaneous vaginal delivery. I have fully reviewed the prenatal and intrapartum course. Postpartum course has been uneventful. Baby is feeding by breast. Bleeding has been normal in amount and decreasing.. Bowel function is normal. Bladder function is normal. Patient not sexually active at this time. Contraception method is discussed. Postpartum depression screening: negative.    The following portions of the patient's history were reviewed and updated as appropriate: allergies, current medications, past family history, past medical history, past social history, past surgical history and problem list.    Review of Systems  Pertinent items are noted in HPI.    Objective   /68   Ht 167.6 cm (66\")   Wt 71.5 kg (157 lb 9.6 oz)   LMP 07/10/2022 (Exact Date)   Breastfeeding Yes   BMI 25.44 kg/m²    General:  alert    Breasts:  normal       Heart:  regular rate and rhytm   Abdomen: Normal findings    Vulva:  normal   Vagina: Normal thin white discharge   Cervix:  Normal with no cervical motion tenderness   Corpus: Normal for post partum visit   Adnexa:  Non tender, non enlarged         Diagnoses and all orders for this visit:    1. Postpartum follow-up (Primary)    2. Breastfeeding (infant)       Normal postpartum exam. Pap smear not done at today's visit.she declined today plans to come back for this  nuswab incidental discharge noted on exam no s/s reported      Contraception: discussed declined anything today encouraged condoms   Slow return to normal activities reviewed. Continue prenatal vitamins.   Follow up in 12 months  Or sooner  as " needed.    Problem List Items Addressed This Visit    None  Visit Diagnoses       Postpartum follow-up    -  Primary    Breastfeeding (infant)

## 2024-01-02 LAB
A VAGINAE DNA VAG QL NAA+PROBE: NORMAL SCORE
BVAB2 DNA VAG QL NAA+PROBE: NORMAL SCORE
C ALBICANS DNA VAG QL NAA+PROBE: NEGATIVE
C GLABRATA DNA VAG QL NAA+PROBE: NEGATIVE
C TRACH DNA VAG QL NAA+PROBE: NEGATIVE
MEGA1 DNA VAG QL NAA+PROBE: NORMAL SCORE
N GONORRHOEA DNA VAG QL NAA+PROBE: NEGATIVE
T VAGINALIS DNA VAG QL NAA+PROBE: NEGATIVE

## (undated) DEVICE — ADAPT CLN BIOGUARD AIR/H2O DISP

## (undated) DEVICE — LN SMPL CO2 SHTRM SD STREAM W/M LUER

## (undated) DEVICE — FRCP BX RADJAW4 NDL 2.8 240CM LG OG BX40

## (undated) DEVICE — BITEBLOCK OMNI BLOC

## (undated) DEVICE — TUBING, SUCTION, 1/4" X 10', STRAIGHT: Brand: MEDLINE

## (undated) DEVICE — SENSR O2 OXIMAX FNGR A/ 18IN NONSTR

## (undated) DEVICE — MSK ENDO PORT O2 POM ELITE CURAPLEX A/

## (undated) DEVICE — KT ORCA ORCAPOD DISP STRL